# Patient Record
Sex: MALE | Race: WHITE | NOT HISPANIC OR LATINO | Employment: OTHER | URBAN - METROPOLITAN AREA
[De-identification: names, ages, dates, MRNs, and addresses within clinical notes are randomized per-mention and may not be internally consistent; named-entity substitution may affect disease eponyms.]

---

## 2024-03-22 ENCOUNTER — APPOINTMENT (OUTPATIENT)
Dept: OPHTHALMOLOGY | Facility: CLINIC | Age: 74
End: 2024-03-22
Payer: COMMERCIAL

## 2024-06-17 ENCOUNTER — APPOINTMENT (OUTPATIENT)
Dept: OPHTHALMOLOGY | Facility: CLINIC | Age: 74
End: 2024-06-17
Payer: COMMERCIAL

## 2024-06-17 DIAGNOSIS — H25.13 NUCLEAR SCLEROTIC CATARACT OF BOTH EYES: ICD-10-CM

## 2024-06-17 DIAGNOSIS — H35.373 EPIRETINAL MEMBRANE (ERM) OF BOTH EYES: ICD-10-CM

## 2024-06-17 DIAGNOSIS — H52.4 PRESBYOPIA: ICD-10-CM

## 2024-06-17 DIAGNOSIS — H52.03 HYPERMETROPIA OF BOTH EYES: ICD-10-CM

## 2024-06-17 DIAGNOSIS — H52.223 REGULAR ASTIGMATISM OF BOTH EYES: Primary | ICD-10-CM

## 2024-06-17 PROBLEM — F41.1 GAD (GENERALIZED ANXIETY DISORDER): Status: ACTIVE | Noted: 2017-12-08

## 2024-06-17 PROBLEM — J45.20 MILD INTERMITTENT ASTHMA, UNCOMPLICATED (HHS-HCC): Status: ACTIVE | Noted: 2017-12-08

## 2024-06-17 PROBLEM — E78.5 HYPERLIPIDEMIA, UNSPECIFIED: Status: ACTIVE | Noted: 2019-10-02

## 2024-06-17 PROBLEM — I10 HTN (HYPERTENSION): Status: ACTIVE | Noted: 2021-05-21

## 2024-06-17 PROCEDURE — 92134 CPTRZ OPH DX IMG PST SGM RTA: CPT | Performed by: OPTOMETRIST

## 2024-06-17 PROCEDURE — 92015 DETERMINE REFRACTIVE STATE: CPT | Performed by: OPTOMETRIST

## 2024-06-17 PROCEDURE — 92004 COMPRE OPH EXAM NEW PT 1/>: CPT | Performed by: OPTOMETRIST

## 2024-06-17 RX ORDER — ATORVASTATIN CALCIUM 20 MG/1
20 TABLET, FILM COATED ORAL DAILY
COMMUNITY

## 2024-06-17 RX ORDER — HYDROCHLOROTHIAZIDE 25 MG/1
25 TABLET ORAL DAILY
COMMUNITY

## 2024-06-17 RX ORDER — OMEPRAZOLE 40 MG/1
CAPSULE, DELAYED RELEASE ORAL
COMMUNITY

## 2024-06-17 RX ORDER — AMLODIPINE BESYLATE 10 MG/1
10 TABLET ORAL DAILY
COMMUNITY

## 2024-06-17 ASSESSMENT — CONF VISUAL FIELD
OS_INFERIOR_TEMPORAL_RESTRICTION: 0
OD_SUPERIOR_NASAL_RESTRICTION: 0
OD_SUPERIOR_TEMPORAL_RESTRICTION: 0
OS_NORMAL: 1
OS_SUPERIOR_TEMPORAL_RESTRICTION: 0
OD_INFERIOR_TEMPORAL_RESTRICTION: 0
OD_NORMAL: 1
OD_INFERIOR_NASAL_RESTRICTION: 0
OS_INFERIOR_NASAL_RESTRICTION: 0
OS_SUPERIOR_NASAL_RESTRICTION: 0

## 2024-06-17 ASSESSMENT — EXTERNAL EXAM - LEFT EYE: OS_EXAM: NORMAL

## 2024-06-17 ASSESSMENT — ENCOUNTER SYMPTOMS
RESPIRATORY NEGATIVE: 0
HEMATOLOGIC/LYMPHATIC NEGATIVE: 0
CARDIOVASCULAR NEGATIVE: 0
EYES NEGATIVE: 1
NEUROLOGICAL NEGATIVE: 0
ENDOCRINE NEGATIVE: 0
CONSTITUTIONAL NEGATIVE: 0
PSYCHIATRIC NEGATIVE: 0
GASTROINTESTINAL NEGATIVE: 0
MUSCULOSKELETAL NEGATIVE: 0
ALLERGIC/IMMUNOLOGIC NEGATIVE: 0

## 2024-06-17 ASSESSMENT — TONOMETRY
OS_IOP_MMHG: 14
OD_IOP_MMHG: 12
IOP_METHOD: GOLDMANN APPLANATION

## 2024-06-17 ASSESSMENT — REFRACTION_MANIFEST
OD_ADD: +2.50
OS_AXIS: 080
OD_AXIS: 070
OS_SPHERE: +0.50
OS_ADD: +2.50
OS_CYLINDER: -0.50
OD_CYLINDER: -0.50
OD_SPHERE: PLANO

## 2024-06-17 ASSESSMENT — SLIT LAMP EXAM - LIDS
COMMENTS: DERMATOCHALASIS W/ HOODING
COMMENTS: DERMATOCHALASIS W/ HOODING

## 2024-06-17 ASSESSMENT — REFRACTION
OD_SPHERE: +0.50
OS_SPHERE: +1.00
OD_CYLINDER: -0.75
OS_CYLINDER: -0.75
OS_AXIS: 080
OD_AXIS: 070
OS_ADD: +2.50
OD_ADD: +2.50

## 2024-06-17 ASSESSMENT — VISUAL ACUITY
OD_SC: 20/40
METHOD: SNELLEN - LINEAR
OS_SC: 20/20
OD_PH_SC: 20/30

## 2024-06-17 ASSESSMENT — CUP TO DISC RATIO
OD_RATIO: 0.15
OS_RATIO: 0.2

## 2024-06-17 ASSESSMENT — EXTERNAL EXAM - RIGHT EYE: OD_EXAM: NORMAL

## 2024-06-17 NOTE — PROGRESS NOTES
Assessment/Plan   Diagnoses and all orders for this visit:  Regular astigmatism of both eyes  Presbyopia  Hypermetropia of both eyes  Spec Rx released. Optional to fill as patient not currently having any difficulty. Ok to use OTC readers as desired. Ocular health WNL for age OU. Monitor 1 year. Refraction billed today. Pt wishes to get BF, discussed, ok to wear as needed.     Epiretinal membrane (ERM) of both eyes  -     OCT, Retina - OU - Both Eyes  Discussed findings and etiology. ERM not visually significant at this time. RTC 1 year for OCT Macula or sooner with changes in vision.    Nuclear sclerotic cataract of both eyes  Patient's cataracts are not visually significant. Will monitor for changes. No indication for surgery at this time.    Pt does ok w/o , but may need to use [facility] or [facility] language line in future.

## 2025-02-12 ENCOUNTER — HOSPITAL ENCOUNTER (EMERGENCY)
Facility: HOSPITAL | Age: 75
Discharge: HOME | End: 2025-02-12
Attending: EMERGENCY MEDICINE
Payer: COMMERCIAL

## 2025-02-12 ENCOUNTER — APPOINTMENT (OUTPATIENT)
Dept: RADIOLOGY | Facility: HOSPITAL | Age: 75
End: 2025-02-12
Payer: COMMERCIAL

## 2025-02-12 VITALS
OXYGEN SATURATION: 98 % | RESPIRATION RATE: 18 BRPM | SYSTOLIC BLOOD PRESSURE: 143 MMHG | HEART RATE: 70 BPM | BODY MASS INDEX: 31.39 KG/M2 | TEMPERATURE: 97.5 F | WEIGHT: 200 LBS | HEIGHT: 67 IN | DIASTOLIC BLOOD PRESSURE: 67 MMHG

## 2025-02-12 DIAGNOSIS — S92.011A CLOSED DISPLACED FRACTURE OF BODY OF RIGHT CALCANEUS, INITIAL ENCOUNTER: ICD-10-CM
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
## 2025-02-12 PROCEDURE — 29515 APPLICATION SHORT LEG SPLINT: CPT | Mod: RT

## 2025-02-12 PROCEDURE — 73610 X-RAY EXAM OF ANKLE: CPT | Mod: RIGHT SIDE | Performed by: RADIOLOGY

## 2025-02-12 PROCEDURE — 2500000001 HC RX 250 WO HCPCS SELF ADMINISTERED DRUGS (ALT 637 FOR MEDICARE OP): Performed by: EMERGENCY MEDICINE

## 2025-02-12 PROCEDURE — 73610 X-RAY EXAM OF ANKLE: CPT | Mod: RT

## 2025-02-12 PROCEDURE — 99283 EMERGENCY DEPT VISIT LOW MDM: CPT | Performed by: EMERGENCY MEDICINE

## 2025-02-12 RX ORDER — IBUPROFEN 600 MG/1
600 TABLET ORAL ONCE
Status: COMPLETED | OUTPATIENT
Start: 2025-02-12 | End: 2025-02-12

## 2025-02-12 RX ORDER — ACETAMINOPHEN 325 MG/1
650 TABLET ORAL ONCE
Status: COMPLETED | OUTPATIENT
Start: 2025-02-12 | End: 2025-02-12

## 2025-02-12 RX ADMIN — ACETAMINOPHEN 650 MG: 325 TABLET, FILM COATED ORAL at 16:14

## 2025-02-12 RX ADMIN — IBUPROFEN 600 MG: 600 TABLET, FILM COATED ORAL at 16:14

## 2025-02-12 ASSESSMENT — COLUMBIA-SUICIDE SEVERITY RATING SCALE - C-SSRS
6. HAVE YOU EVER DONE ANYTHING, STARTED TO DO ANYTHING, OR PREPARED TO DO ANYTHING TO END YOUR LIFE?: NO
1. IN THE PAST MONTH, HAVE YOU WISHED YOU WERE DEAD OR WISHED YOU COULD GO TO SLEEP AND NOT WAKE UP?: NO
2. HAVE YOU ACTUALLY HAD ANY THOUGHTS OF KILLING YOURSELF?: NO

## 2025-02-12 NOTE — ED PROVIDER NOTES
Emergency Department Provider Note        History of Present Illness     History provided by: Patient  Limitations to History: Language Barrier  External Records Reviewed with Brief Summary: None    HPI:  Edwin Mayorga is a 74 y.o. male [place] to the ED after mechanical fall down a ladder.  Complaining of right foot pain.  Denies head injury or other injuries.    Physical Exam   Triage vitals:  T 36.4 °C (97.5 °F)  HR 70  /67  RR 18  O2 98 %      General: Awake, alert, in no acute distress  Eyes: Gaze conjugate.  No scleral icterus or injection  HENT: Normo-cephalic, atraumatic. No stridor  CV: Regular rate, regular rhythm. Radial pulses 2+ bilaterally  Resp: Breathing non-labored, speaking in full sentences.  Clear to auscultation bilaterally  GI: Soft, non-distended, non-tender. No rebound or guarding.  : Deferred  MSK/Extremities: No gross bony deformities. Moving all extremities. Right ankle swelling  Skin: Warm. Appropriate color  Neuro: Alert. Oriented. Face symmetric. Speech is fluent.  Gross strength and sensation intact in b/l UE and LEs  Psych: Appropriate mood and affect    Medical Decision Making & ED Course   Medical Decision Makin y.o. male to the ED with right ankle and foot pain after mechanical fall.  No other injuries.  Upon arrival to the ED in no acute distress.  On examination he has diffuse swelling around the right ankle more T's.  Otherwise he has range of motion and is neurovascular intact.  X-ray imaging significant for a calcaneal fracture.  Podiatry (Dr. Sorensen) was consulted who recommends close follow-up.  Patient was placed in a splint.  Crutches were provided.  Was given follow-up information on discharge paperwork.  All questions were answered.  ----      Differential diagnoses considered include but are not limited to: N/A     Social Determinants of Health which Significantly Impact Care: None identified     EKG Independent Interpretation: EKG not  obtained    Independent Result Review and Interpretation: Relevant laboratory and radiographic results were reviewed and independently interpreted by myself.  As necessary, they are commented on in the ED Course.    Chronic conditions affecting the patient's care: As documented above in [facility]    The patient was discussed with the following consultants/services: None    Care Considerations: As documented above in [facility]    ED Course:  Diagnoses as of 02/15/25 1133   Fall, initial encounter   Closed displaced fracture of body of right calcaneus, initial encounter     Disposition   As a result of the work-up, the patient was discharged home.  he was informed of his diagnosis and instructed to come back with any concerns or worsening of condition.  he and was agreeable to the plan as discussed above.  he was given the opportunity to ask questions.  All of the patient's questions were answered.    Procedures   Procedures    Patient was seen independently    Avtar Berry MD  Emergency Medicine     Avtar Berry MD  02/15/25 113

## 2025-02-12 NOTE — ED TRIAGE NOTES
Patient arrives from home with complaints of right ankle pain and back pain after having fallen off a ladder onto his feet and possibly twisting his ankle, he then fell sideways onto the floor and injured his back

## 2025-06-20 ENCOUNTER — HOSPITAL ENCOUNTER (EMERGENCY)
Facility: HOSPITAL | Age: 75
Discharge: HOME | End: 2025-06-20
Attending: EMERGENCY MEDICINE
Payer: MEDICARE

## 2025-06-20 ENCOUNTER — APPOINTMENT (OUTPATIENT)
Dept: RADIOLOGY | Facility: HOSPITAL | Age: 75
End: 2025-06-20
Payer: MEDICARE

## 2025-06-20 ENCOUNTER — APPOINTMENT (OUTPATIENT)
Dept: CARDIOLOGY | Facility: HOSPITAL | Age: 75
End: 2025-06-20
Payer: MEDICARE

## 2025-06-20 ENCOUNTER — HOSPITAL ENCOUNTER (OUTPATIENT)
Dept: CARDIOLOGY | Facility: HOSPITAL | Age: 75
Discharge: HOME | End: 2025-06-20
Payer: MEDICARE

## 2025-06-20 VITALS
WEIGHT: 200 LBS | SYSTOLIC BLOOD PRESSURE: 124 MMHG | BODY MASS INDEX: 31.39 KG/M2 | DIASTOLIC BLOOD PRESSURE: 62 MMHG | TEMPERATURE: 96.6 F | RESPIRATION RATE: 16 BRPM | HEIGHT: 67 IN | HEART RATE: 67 BPM | OXYGEN SATURATION: 95 %

## 2025-06-20 DIAGNOSIS — K80.20 SYMPTOMATIC CHOLELITHIASIS: Primary | ICD-10-CM

## 2025-06-20 DIAGNOSIS — R10.9 ABDOMINAL PAIN: ICD-10-CM

## 2025-06-20 LAB
ALBUMIN SERPL BCP-MCNC: 4.3 G/DL (ref 3.4–5)
ALP SERPL-CCNC: 87 U/L (ref 33–136)
ALT SERPL W P-5'-P-CCNC: 10 U/L (ref 10–52)
ANION GAP SERPL CALC-SCNC: 14 MMOL/L (ref 10–20)
AST SERPL W P-5'-P-CCNC: 11 U/L (ref 9–39)
BASOPHILS # BLD AUTO: 0.03 X10*3/UL (ref 0–0.1)
BASOPHILS NFR BLD AUTO: 0.2 %
BILIRUB SERPL-MCNC: 1.2 MG/DL (ref 0–1.2)
BNP SERPL-MCNC: 29 PG/ML (ref 0–99)
BUN SERPL-MCNC: 20 MG/DL (ref 6–23)
CALCIUM SERPL-MCNC: 9.1 MG/DL (ref 8.6–10.3)
CARDIAC TROPONIN I PNL SERPL HS: 4 NG/L (ref 0–20)
CARDIAC TROPONIN I PNL SERPL HS: <3 NG/L (ref 0–20)
CHLORIDE SERPL-SCNC: 100 MMOL/L (ref 98–107)
CO2 SERPL-SCNC: 27 MMOL/L (ref 21–32)
CREAT SERPL-MCNC: 0.92 MG/DL (ref 0.5–1.3)
EGFRCR SERPLBLD CKD-EPI 2021: 87 ML/MIN/1.73M*2
EOSINOPHIL # BLD AUTO: 0 X10*3/UL (ref 0–0.4)
EOSINOPHIL NFR BLD AUTO: 0 %
ERYTHROCYTE [DISTWIDTH] IN BLOOD BY AUTOMATED COUNT: 12.9 % (ref 11.5–14.5)
GLUCOSE SERPL-MCNC: 175 MG/DL (ref 74–99)
HCT VFR BLD AUTO: 41.7 % (ref 41–52)
HGB BLD-MCNC: 15 G/DL (ref 13.5–17.5)
IMM GRANULOCYTES # BLD AUTO: 0.07 X10*3/UL (ref 0–0.5)
IMM GRANULOCYTES NFR BLD AUTO: 0.5 % (ref 0–0.9)
LIPASE SERPL-CCNC: 17 U/L (ref 9–82)
LYMPHOCYTES # BLD AUTO: 0.51 X10*3/UL (ref 0.8–3)
LYMPHOCYTES NFR BLD AUTO: 3.7 %
MCH RBC QN AUTO: 30.5 PG (ref 26–34)
MCHC RBC AUTO-ENTMCNC: 36 G/DL (ref 32–36)
MCV RBC AUTO: 85 FL (ref 80–100)
MONOCYTES # BLD AUTO: 0.64 X10*3/UL (ref 0.05–0.8)
MONOCYTES NFR BLD AUTO: 4.7 %
NEUTROPHILS # BLD AUTO: 12.39 X10*3/UL (ref 1.6–5.5)
NEUTROPHILS NFR BLD AUTO: 90.9 %
NRBC BLD-RTO: 0 /100 WBCS (ref 0–0)
PLATELET # BLD AUTO: 176 X10*3/UL (ref 150–450)
POTASSIUM SERPL-SCNC: 3.1 MMOL/L (ref 3.5–5.3)
PROT SERPL-MCNC: 6.8 G/DL (ref 6.4–8.2)
RBC # BLD AUTO: 4.92 X10*6/UL (ref 4.5–5.9)
SODIUM SERPL-SCNC: 138 MMOL/L (ref 136–145)
WBC # BLD AUTO: 13.6 X10*3/UL (ref 4.4–11.3)

## 2025-06-20 PROCEDURE — 84484 ASSAY OF TROPONIN QUANT: CPT | Performed by: EMERGENCY MEDICINE

## 2025-06-20 PROCEDURE — 2500000005 HC RX 250 GENERAL PHARMACY W/O HCPCS: Performed by: EMERGENCY MEDICINE

## 2025-06-20 PROCEDURE — 74177 CT ABD & PELVIS W/CONTRAST: CPT

## 2025-06-20 PROCEDURE — 96376 TX/PRO/DX INJ SAME DRUG ADON: CPT

## 2025-06-20 PROCEDURE — 96374 THER/PROPH/DIAG INJ IV PUSH: CPT | Mod: 59

## 2025-06-20 PROCEDURE — 36415 COLL VENOUS BLD VENIPUNCTURE: CPT | Performed by: EMERGENCY MEDICINE

## 2025-06-20 PROCEDURE — 83690 ASSAY OF LIPASE: CPT | Performed by: EMERGENCY MEDICINE

## 2025-06-20 PROCEDURE — 74177 CT ABD & PELVIS W/CONTRAST: CPT | Mod: FOREIGN READ | Performed by: RADIOLOGY

## 2025-06-20 PROCEDURE — 76705 ECHO EXAM OF ABDOMEN: CPT

## 2025-06-20 PROCEDURE — 2500000001 HC RX 250 WO HCPCS SELF ADMINISTERED DRUGS (ALT 637 FOR MEDICARE OP): Performed by: EMERGENCY MEDICINE

## 2025-06-20 PROCEDURE — 85025 COMPLETE CBC W/AUTO DIFF WBC: CPT | Performed by: EMERGENCY MEDICINE

## 2025-06-20 PROCEDURE — 2550000001 HC RX 255 CONTRASTS: Performed by: EMERGENCY MEDICINE

## 2025-06-20 PROCEDURE — 83880 ASSAY OF NATRIURETIC PEPTIDE: CPT | Performed by: EMERGENCY MEDICINE

## 2025-06-20 PROCEDURE — 2500000004 HC RX 250 GENERAL PHARMACY W/ HCPCS (ALT 636 FOR OP/ED): Performed by: EMERGENCY MEDICINE

## 2025-06-20 PROCEDURE — 76705 ECHO EXAM OF ABDOMEN: CPT | Performed by: RADIOLOGY

## 2025-06-20 PROCEDURE — 99285 EMERGENCY DEPT VISIT HI MDM: CPT | Mod: 25 | Performed by: EMERGENCY MEDICINE

## 2025-06-20 PROCEDURE — 71045 X-RAY EXAM CHEST 1 VIEW: CPT | Performed by: RADIOLOGY

## 2025-06-20 PROCEDURE — 80053 COMPREHEN METABOLIC PANEL: CPT | Performed by: EMERGENCY MEDICINE

## 2025-06-20 PROCEDURE — 96375 TX/PRO/DX INJ NEW DRUG ADDON: CPT

## 2025-06-20 PROCEDURE — 71045 X-RAY EXAM CHEST 1 VIEW: CPT

## 2025-06-20 PROCEDURE — 93005 ELECTROCARDIOGRAM TRACING: CPT

## 2025-06-20 RX ORDER — ONDANSETRON HYDROCHLORIDE 2 MG/ML
4 INJECTION, SOLUTION INTRAVENOUS ONCE
Status: COMPLETED | OUTPATIENT
Start: 2025-06-20 | End: 2025-06-20

## 2025-06-20 RX ORDER — ALUMINUM HYDROXIDE, MAGNESIUM HYDROXIDE, AND SIMETHICONE 1200; 120; 1200 MG/30ML; MG/30ML; MG/30ML
30 SUSPENSION ORAL ONCE
Status: COMPLETED | OUTPATIENT
Start: 2025-06-20 | End: 2025-06-20

## 2025-06-20 RX ORDER — ONDANSETRON 4 MG/1
4 TABLET, ORALLY DISINTEGRATING ORAL EVERY 8 HOURS PRN
Qty: 10 TABLET | Refills: 0 | Status: ON HOLD | OUTPATIENT
Start: 2025-06-20 | End: 2025-07-01 | Stop reason: ALTCHOICE

## 2025-06-20 RX ORDER — FAMOTIDINE 10 MG/ML
20 INJECTION, SOLUTION INTRAVENOUS ONCE
Status: COMPLETED | OUTPATIENT
Start: 2025-06-20 | End: 2025-06-20

## 2025-06-20 RX ORDER — MORPHINE SULFATE 4 MG/ML
4 INJECTION, SOLUTION INTRAMUSCULAR; INTRAVENOUS ONCE
Status: COMPLETED | OUTPATIENT
Start: 2025-06-20 | End: 2025-06-20

## 2025-06-20 RX ORDER — LIDOCAINE HYDROCHLORIDE 20 MG/ML
15 SOLUTION OROPHARYNGEAL ONCE
Status: COMPLETED | OUTPATIENT
Start: 2025-06-20 | End: 2025-06-20

## 2025-06-20 RX ADMIN — MORPHINE SULFATE 4 MG: 4 INJECTION, SOLUTION INTRAMUSCULAR; INTRAVENOUS at 07:26

## 2025-06-20 RX ADMIN — ONDANSETRON 4 MG: 2 INJECTION INTRAMUSCULAR; INTRAVENOUS at 05:47

## 2025-06-20 RX ADMIN — LIDOCAINE HYDROCHLORIDE 15 ML: 20 SOLUTION ORAL at 03:00

## 2025-06-20 RX ADMIN — ONDANSETRON 4 MG: 2 INJECTION INTRAMUSCULAR; INTRAVENOUS at 03:00

## 2025-06-20 RX ADMIN — MORPHINE SULFATE 4 MG: 4 INJECTION, SOLUTION INTRAMUSCULAR; INTRAVENOUS at 05:47

## 2025-06-20 RX ADMIN — IOHEXOL 79 ML: 350 INJECTION, SOLUTION INTRAVENOUS at 05:35

## 2025-06-20 RX ADMIN — FAMOTIDINE 20 MG: 10 INJECTION, SOLUTION INTRAVENOUS at 03:00

## 2025-06-20 RX ADMIN — ONDANSETRON 4 MG: 2 INJECTION INTRAMUSCULAR; INTRAVENOUS at 07:25

## 2025-06-20 RX ADMIN — ALUMINUM HYDROXIDE, MAGNESIUM HYDROXIDE, AND DIMETHICONE 30 ML: 200; 20; 200 SUSPENSION ORAL at 03:00

## 2025-06-20 ASSESSMENT — PAIN - FUNCTIONAL ASSESSMENT
PAIN_FUNCTIONAL_ASSESSMENT: 0-10

## 2025-06-20 ASSESSMENT — PAIN DESCRIPTION - LOCATION
LOCATION: ABDOMEN

## 2025-06-20 ASSESSMENT — PAIN SCALES - GENERAL
PAINLEVEL_OUTOF10: 7
PAINLEVEL_OUTOF10: 8
PAINLEVEL_OUTOF10: 8
PAINLEVEL_OUTOF10: 6

## 2025-06-20 ASSESSMENT — PAIN DESCRIPTION - ORIENTATION
ORIENTATION: MID;UPPER
ORIENTATION: MID

## 2025-06-20 ASSESSMENT — PAIN DESCRIPTION - PAIN TYPE
TYPE: ACUTE PAIN
TYPE: ACUTE PAIN

## 2025-06-20 ASSESSMENT — PAIN DESCRIPTION - DESCRIPTORS: DESCRIPTORS: SHARP

## 2025-06-20 NOTE — ED PROVIDER NOTES
HPI   Chief Complaint   Patient presents with    Abdominal Pain       74-year-old male presenting with epigastric pain which started about 2200 last evening.  Patient states that he has a history of GERD and he takes omeprazole for this.  He states that yesterday he ate tomato based Borsht which was spicy and then for dinner at 6:00 he had spaghetti.   He did have an episode of nausea and vomiting.  Denies fevers or chills.  Denies shortness of breath, fever, chills.              Patient History   Medical History[1]  Surgical History[2]  Family History[3]  Social History[4]    Physical Exam   ED Triage Vitals [06/20/25 0201]   Temperature Heart Rate Respirations BP   35.9 °C (96.6 °F) 65 16 143/67      Pulse Ox Temp Source Heart Rate Source Patient Position   100 % Temporal Monitor Sitting      BP Location FiO2 (%)     Right arm --       Physical Exam  Constitutional:       General: He is not in acute distress.  HENT:      Head: Normocephalic and atraumatic.   Eyes:      Extraocular Movements: Extraocular movements intact.   Pulmonary:      Effort: Pulmonary effort is normal.      Breath sounds: Normal breath sounds.   Abdominal:      Tenderness: There is abdominal tenderness in the epigastric area. Negative signs include Rees's sign.   Skin:     General: Skin is warm.   Neurological:      General: No focal deficit present.      Mental Status: He is alert.   Psychiatric:         Mood and Affect: Mood normal.           ED Course & MDM                  No data recorded     Starks Coma Scale Score: 15 (06/20/25 0203 : Leola Ireland RN)                           Medical Decision Making  Patient presented with epigastric pain, nausea, vomiting.  Differential includes but is not limited to ACS, GERD, gastritis, peptic ulcer disease, acute cholelithiasis, pancreatitis, anemia, electrolyte abnormalities.  IV line was established.  Patient was medicated with IV Pepcid and a GI cocktail.  CBC will be obtained to assess  white blood cell count, hemoglobin and platelet.  CMP to assess liver function, renal function, electrolytes, glucose.  High-sensitivity troponin and EKG to assess for ischemia/dysrhythmia.  Chest x-ray to rule out pneumonia.  Patient states minimal improvement with GI cocktail.Neurosurgery CBC shows a slight leukocytosis of 13.6.  Hemoglobin hematocrit are stable.  Platelets are normal.  Renal function and electrolytes unremarkable with exception of a potassium of 3.1 lipase within normal limits at 17 BNP 29 troponin is 4.  Will order morphine and Zofran for the patient's pain.  Will obtain CT of the abdomen pelvis with IV contrast.  CT of the abdomen pelvis with IV contrast shows the gallbladder is mildly distended.  There are punctate foci of air within the gallbladder.  No common bile duct distention recommendation is for further evaluation with gallbladder ultrasound.  Patient will be signed out to incoming ED physician for follow-up of ultrasound and disposition.        Procedure  Procedures         [1] No past medical history on file.  [2] No past surgical history on file.  [3] No family history on file.  [4]   Social History  Tobacco Use    Smoking status: Former     Types: Cigarettes    Smokeless tobacco: Not on file   Vaping Use    Vaping status: Never Used   Substance Use Topics    Alcohol use: Not on file    Drug use: Never        Joe Bailey,   06/20/25 0716

## 2025-06-20 NOTE — PROGRESS NOTES
This patient was seen by the offgoing provider.  Please see their note for full history and physical exam.    Briefly, this is a 74 y.o. male presenting to the ED with ***        Jose Bermudez MD  Emergency Medicine Attending

## 2025-06-20 NOTE — ED TRIAGE NOTES
Patient presents to ED with complaints of epigastric  pain since 2200 yesterday evening. Patient state sharp epigastric pain with associated nausea and vomiting.

## 2025-06-20 NOTE — DISCHARGE INSTRUCTIONS
You were seen in the ED for abdominal pain.  This is likely related to gallstones and sludge in your gallbladder.  Use ibuprofen and Tylenol for pain.  Use Zofran as prescribed for nausea and vomiting.  Follow up with the general surgery doctor and your primary doctor.  Return to the ED for any worsening symptoms.    --------------    ??? ?????????? ?? ?????????? ???????????? ???????? ?? ???????? ?? ???? ? ??????. ????????, ?? ???'????? ? ???????? ???????? ?? ?????? ? ?????? ???????? ??????. ?????????????? ????????? ?? ???????? ??? ????. ?????????????? ?????? ?? ???????????? ?????? ??? ?????? ?? ???????. ?????????? ?? ?????? ????????? ???????? ?? ?????? ????????? ??????. ??????????? ?? ?????????? ???????????? ???????? ??? ????-????? ?????????? ?????????.

## 2025-06-24 NOTE — PROGRESS NOTES
History Of Present Illness  HPI   The patient is a 74-year-old male who was seen in the emergency room on June 20, 2025 with epigastric abdominal pain.  I reviewed the emergency room notes.  The patient had upper abdominal sharp pain associated with nausea and vomiting which lasted 2 days.  His symptoms have now improved, but he still has mild right upper quadrant discomfort with inspiration and the area remains a bit tender.  He has a poor appetite but has been tolerating liquids without vomiting.  No prior similar symptoms.  No history of jaundice.    Past medical history:  GERD  Prostate cancer treated with radiation therapy.  Follows at [facility].  Hypertension  Hyperlipidemia  Fell off a ladder 5 months ago and fractured his right heel.  He is wearing a right boot.    Past Medical History  He has no past medical history on file.    Surgical History  He has no past surgical history on file.     Allergies  Patient has no known allergies.    Social History  He reports that he has quit smoking. His smoking use included cigarettes. He has never used smokeless tobacco. He reports that he does not currently use alcohol. He reports that he does not use drugs.    Family History  Family History[1]    Review of Systems  Review of Systems:  Constitutional:  no fever, no chills, no significant weight change  Neurological: No history of CVA or seizure disorder  Eyes: No pain, no recent visual change  ENT:  No recent hearing loss  Neck: No pain  Cardiovascular: No chest pain, no history of cardiac disease such as myocardial infarction or arrhythmia or congestive heart failure  Pulmonary: Bronchitis with shortness of breath.  No other history of pulmonary disease such as pneumonia or COPD  Breast: No history of breast disease or breast mass  Gastrointestinal: As above.  No constipation or diarrhea or blood in the stool.  No history of ulcers.  No liver or pancreas disease.  No intestinal disorder.  Genitourinary: No hematuria or  "dysuria, no kidney disease  Musculoskeletal:  no arthralgia, no muscle or bone pain  Integumentary:  no rash  Psychiatric: Anxiety  Endocrine:  no history of diabetes  Hematologic/Lymphatic: No easy bruising or bleeding    Last Recorded Vitals  Blood pressure 148/74, pulse 79, temperature 36.2 °C (97.1 °F), temperature source Temporal, height 1.702 m (5' 7\"), weight 84.8 kg (187 lb), SpO2 95%.    Physical Exam  Constitutional: Well-developed, well-nourished, alert and oriented, no acute distress  Skin: Warm and dry, no lesions, no rashes, no jaundice  HEENT: Normocephalic, atraumatic, EOMI, no scleral icterus, eyes have no redness or swelling or discharge, external inspection of ears and nose is normal, mucous membranes moist  Neck: Soft, nontender, no mass or adenopathy  Cardiac: Regular rate and rhythm, no murmur  Chest: Patent airway, clear to auscultation, normal breath sounds with good chest expansion, no wheezes or rales or rhonchi noted, thorax symmetric  Abdomen: Nondistended, positive bowel sounds, soft, minimal right upper quadrant tenderness, the rest of the abdomen is nontender, no mass  Rectal: Not performed  Extremities: Right boot in place.  No lower extremity edema or calf tenderness  Lymphatic: No cervical adenopathy  Musculoskeletal: Range of motion intact, no joint swelling, normal strength  Neurological: Alert and oriented x3, intact sensory and motor function, no obvious focal neurologic abnormalities, normal gait  Psychological: Appropriate mood and behavior    Relevant Results  Labs from June 20, 2025: WBC 13.6, hemoglobin 15.0, platelet 176  Potassium 3.1, glucose 175, CMP otherwise normal.  Lipase normal.    I reviewed the CT abdomen/pelvis report and images from June 20, 2025:  IMPRESSION:  1.The gallbladder is mildly distended. There are punctate foci of  air within the gallbladder. No calcified gallstones. No common bile  duct distention. Recommend further evaluation with " gallbladder  ultrasound.  2.There are 2 relatively hyperdense lesions within the left  kidney measuring up to 4 cm. These may represent hemorrhagic or  proteinaceous cysts. Recommend further evaluation with nonemergent  renal MRI.    Reviewed the right upper quadrant ultrasound report and images from June 20, 2025:  IMPRESSION:  1. Echogenic sludge and debris within the gallbladder. No  cholelithiasis or acute cholecystitis. Punctate gas collection seen  on prior CT imaging not definitely identified on the current study.    2. Right renal cysts.    I reviewed the CT and ultrasound with a radiologist who feels the patient likely has a gallbladder full of tiny stones.    Assessment/Plan   Diagnoses and all orders for this visit:  Calculus of gallbladder with chronic cholecystitis without obstruction  -     Referral to General Surgery    74-year-old male with cholelithiasis and symptoms consistent with chronic cholecystitis.  Recommend laparoscopic cholecystectomy with cholangiogram with possible open operation.  I discussed the procedure and risks with the patient and his son. The risks include bleeding, infection, bile leak, injury to surrounding structures such as the common bile duct/duodenum, cardiac/pulmonary/renal complications, post op diarrhea.  The patient does not wish to schedule cholecystectomy at this time.  I discussed the risks of not operating which include acute cholecystitis, cholangitis, pancreatitis.  Recommend low-fat diet.  Follow-up as needed if the patient's symptoms worsen or if he decides to schedule cholecystectomy.  I also told the patient to follow-up with PCP regarding left kidney lesion.      Brent Johnson MD         [1] No family history on file.

## 2025-06-25 ENCOUNTER — APPOINTMENT (OUTPATIENT)
Dept: SURGERY | Facility: CLINIC | Age: 75
End: 2025-06-25
Payer: MEDICARE

## 2025-06-25 VITALS
BODY MASS INDEX: 29.35 KG/M2 | OXYGEN SATURATION: 95 % | SYSTOLIC BLOOD PRESSURE: 148 MMHG | DIASTOLIC BLOOD PRESSURE: 74 MMHG | HEART RATE: 79 BPM | TEMPERATURE: 97.1 F | HEIGHT: 67 IN | WEIGHT: 187 LBS

## 2025-06-25 DIAGNOSIS — K80.10 CALCULUS OF GALLBLADDER WITH CHRONIC CHOLECYSTITIS WITHOUT OBSTRUCTION: ICD-10-CM

## 2025-06-25 PROCEDURE — 1159F MED LIST DOCD IN RCRD: CPT | Performed by: SURGERY

## 2025-06-25 PROCEDURE — 99204 OFFICE O/P NEW MOD 45 MIN: CPT | Performed by: SURGERY

## 2025-06-25 PROCEDURE — 3078F DIAST BP <80 MM HG: CPT | Performed by: SURGERY

## 2025-06-25 PROCEDURE — 3008F BODY MASS INDEX DOCD: CPT | Performed by: SURGERY

## 2025-06-25 PROCEDURE — 3077F SYST BP >= 140 MM HG: CPT | Performed by: SURGERY

## 2025-06-25 PROCEDURE — 1036F TOBACCO NON-USER: CPT | Performed by: SURGERY

## 2025-06-25 NOTE — LETTER
June 25, 2025     Raúl Weston DO  [address] [facility] [address]    Patient: Edwin Mayorga   YOB: 1950   Date of Visit: 6/25/2025       Dear Dr. Raúl Weston DO:    Thank you for referring Edwin Mayorga to me for evaluation. Below are my notes for this consultation.  If you have questions, please do not hesitate to call me. I look forward to following your patient along with you.       Sincerely,     Brent Johnson MD      CC: No Recipients  ______________________________________________________________________________________    History Of Present Illness  HPI   The patient is a 74-year-old male who was seen in the emergency room on June 20, 2025 with epigastric abdominal pain.  I reviewed the emergency room notes.  The patient had upper abdominal sharp pain associated with nausea and vomiting which lasted 2 days.  His symptoms have now improved, but he still has mild right upper quadrant discomfort with inspiration and the area remains a bit tender.  He has a poor appetite but has been tolerating liquids without vomiting.  No prior similar symptoms.  No history of jaundice.    Past medical history:  GERD  Prostate cancer treated with radiation therapy.  Follows at [facility].  Hypertension  Hyperlipidemia  Fell off a ladder 5 months ago and fractured his right heel.  He is wearing a right boot.    Past Medical History  He has no past medical history on file.    Surgical History  He has no past surgical history on file.     Allergies  Patient has no known allergies.    Social History  He reports that he has quit smoking. His smoking use included cigarettes. He has never used smokeless tobacco. He reports that he does not currently use alcohol. He reports that he does not use drugs.    Family History  Family History[1]    Review of Systems  Review of Systems:  Constitutional:  no fever, no chills, no significant weight change  Neurological: No history of CVA or seizure  "disorder  Eyes: No pain, no recent visual change  ENT:  No recent hearing loss  Neck: No pain  Cardiovascular: No chest pain, no history of cardiac disease such as myocardial infarction or arrhythmia or congestive heart failure  Pulmonary: Bronchitis with shortness of breath.  No other history of pulmonary disease such as pneumonia or COPD  Breast: No history of breast disease or breast mass  Gastrointestinal: As above.  No constipation or diarrhea or blood in the stool.  No history of ulcers.  No liver or pancreas disease.  No intestinal disorder.  Genitourinary: No hematuria or dysuria, no kidney disease  Musculoskeletal:  no arthralgia, no muscle or bone pain  Integumentary:  no rash  Psychiatric: Anxiety  Endocrine:  no history of diabetes  Hematologic/Lymphatic: No easy bruising or bleeding    Last Recorded Vitals  Blood pressure 148/74, pulse 79, temperature 36.2 °C (97.1 °F), temperature source Temporal, height 1.702 m (5' 7\"), weight 84.8 kg (187 lb), SpO2 95%.    Physical Exam  Constitutional: Well-developed, well-nourished, alert and oriented, no acute distress  Skin: Warm and dry, no lesions, no rashes, no jaundice  HEENT: Normocephalic, atraumatic, EOMI, no scleral icterus, eyes have no redness or swelling or discharge, external inspection of ears and nose is normal, mucous membranes moist  Neck: Soft, nontender, no mass or adenopathy  Cardiac: Regular rate and rhythm, no murmur  Chest: Patent airway, clear to auscultation, normal breath sounds with good chest expansion, no wheezes or rales or rhonchi noted, thorax symmetric  Abdomen: Nondistended, positive bowel sounds, soft, minimal right upper quadrant tenderness, the rest of the abdomen is nontender, no mass  Rectal: Not performed  Extremities: Right boot in place.  No lower extremity edema or calf tenderness  Lymphatic: No cervical adenopathy  Musculoskeletal: Range of motion intact, no joint swelling, normal strength  Neurological: Alert and " oriented x3, intact sensory and motor function, no obvious focal neurologic abnormalities, normal gait  Psychological: Appropriate mood and behavior    Relevant Results  Labs from June 20, 2025: WBC 13.6, hemoglobin 15.0, platelet 176  Potassium 3.1, glucose 175, CMP otherwise normal.  Lipase normal.    I reviewed the CT abdomen/pelvis report and images from June 20, 2025:  IMPRESSION:  1.The gallbladder is mildly distended. There are punctate foci of  air within the gallbladder. No calcified gallstones. No common bile  duct distention. Recommend further evaluation with gallbladder  ultrasound.  2.There are 2 relatively hyperdense lesions within the left  kidney measuring up to 4 cm. These may represent hemorrhagic or  proteinaceous cysts. Recommend further evaluation with nonemergent  renal MRI.    Reviewed the right upper quadrant ultrasound report and images from June 20, 2025:  IMPRESSION:  1. Echogenic sludge and debris within the gallbladder. No  cholelithiasis or acute cholecystitis. Punctate gas collection seen  on prior CT imaging not definitely identified on the current study.    2. Right renal cysts.    I reviewed the CT and ultrasound with a radiologist who feels the patient likely has a gallbladder full of tiny stones.    Assessment/Plan   Diagnoses and all orders for this visit:  Calculus of gallbladder with chronic cholecystitis without obstruction  -     Referral to General Surgery    74-year-old male with cholelithiasis and symptoms consistent with chronic cholecystitis.  Recommend laparoscopic cholecystectomy with cholangiogram with possible open operation.  I discussed the procedure and risks with the patient and his son. The risks include bleeding, infection, bile leak, injury to surrounding structures such as the common bile duct/duodenum, cardiac/pulmonary/renal complications, post op diarrhea.  The patient does not wish to schedule cholecystectomy at this time.  I discussed the risks of not  operating which include acute cholecystitis, cholangitis, pancreatitis.  Recommend low-fat diet.  Follow-up as needed if the patient's symptoms worsen or if he decides to schedule cholecystectomy.  I also told the patient to follow-up with PCP regarding left kidney lesion.      Brent Johnson MD         [1]  No family history on file.       [1]  No family history on file.

## 2025-06-27 LAB
ATRIAL RATE: 65 BPM
P OFFSET: 205 MS
P ONSET: 143 MS
PR INTERVAL: 154 MS
Q ONSET: 220 MS
QRS COUNT: 11 BEATS
QRS DURATION: 86 MS
QT INTERVAL: 414 MS
QTC CALCULATION(BAZETT): 430 MS
QTC FREDERICIA: 425 MS
R AXIS: 67 DEGREES
T AXIS: 65 DEGREES
T OFFSET: 427 MS
VENTRICULAR RATE: 65 BPM

## 2025-06-27 PROCEDURE — 93005 ELECTROCARDIOGRAM TRACING: CPT

## 2025-06-30 ENCOUNTER — PREP FOR PROCEDURE (OUTPATIENT)
Dept: SURGERY | Facility: CLINIC | Age: 75
End: 2025-06-30

## 2025-06-30 ENCOUNTER — APPOINTMENT (OUTPATIENT)
Dept: SURGERY | Facility: CLINIC | Age: 75
End: 2025-06-30
Payer: MEDICARE

## 2025-06-30 ENCOUNTER — HOSPITAL ENCOUNTER (OUTPATIENT)
Facility: HOSPITAL | Age: 75
Setting detail: OUTPATIENT SURGERY
End: 2025-06-30
Attending: SURGERY | Admitting: SURGERY
Payer: MEDICARE

## 2025-06-30 VITALS
BODY MASS INDEX: 29.35 KG/M2 | TEMPERATURE: 97.6 F | SYSTOLIC BLOOD PRESSURE: 141 MMHG | HEIGHT: 67 IN | DIASTOLIC BLOOD PRESSURE: 70 MMHG | OXYGEN SATURATION: 95 % | RESPIRATION RATE: 17 BRPM | WEIGHT: 187 LBS | HEART RATE: 69 BPM

## 2025-06-30 DIAGNOSIS — K80.10 CALCULUS OF GALLBLADDER WITH CHRONIC CHOLECYSTITIS WITHOUT OBSTRUCTION: Primary | ICD-10-CM

## 2025-06-30 PROCEDURE — 1159F MED LIST DOCD IN RCRD: CPT | Performed by: SURGERY

## 2025-06-30 PROCEDURE — 3008F BODY MASS INDEX DOCD: CPT | Performed by: SURGERY

## 2025-06-30 PROCEDURE — 99213 OFFICE O/P EST LOW 20 MIN: CPT | Performed by: SURGERY

## 2025-06-30 PROCEDURE — 1036F TOBACCO NON-USER: CPT | Performed by: SURGERY

## 2025-06-30 PROCEDURE — 3078F DIAST BP <80 MM HG: CPT | Performed by: SURGERY

## 2025-06-30 PROCEDURE — 1125F AMNT PAIN NOTED PAIN PRSNT: CPT | Performed by: SURGERY

## 2025-06-30 PROCEDURE — 3077F SYST BP >= 140 MM HG: CPT | Performed by: SURGERY

## 2025-06-30 RX ORDER — CEFAZOLIN SODIUM 2 G/100ML
2 INJECTION, SOLUTION INTRAVENOUS ONCE
Status: CANCELLED | OUTPATIENT
Start: 2025-06-30 | End: 2025-06-30

## 2025-06-30 RX ORDER — ALBUTEROL SULFATE 90 UG/1
1-2 INHALANT RESPIRATORY (INHALATION) EVERY 6 HOURS PRN
COMMUNITY
Start: 2025-01-13

## 2025-06-30 ASSESSMENT — PAIN SCALES - GENERAL: PAINLEVEL_OUTOF10: 5

## 2025-06-30 NOTE — H&P (VIEW-ONLY)
"History Of Present Illness  HPI   June 25, 2025  The patient is a 74-year-old male who was seen in the emergency room on June 20, 2025 with epigastric abdominal pain.  I reviewed the emergency room notes.  The patient had upper abdominal sharp pain associated with nausea and vomiting which lasted 2 days.  His symptoms have now improved, but he still has mild right upper quadrant discomfort with inspiration and the area remains a bit tender.  He has a poor appetite but has been tolerating liquids without vomiting.  No prior similar symptoms.  No history of jaundice.    June 30, 2025  The patient's symptoms have improved since yesterday, but he still has mild abdominal pain and nausea.  He now wishes to have his gallbladder removed.    Past medical history:  GERD  Prostate cancer treated with radiation therapy.  Follows at [facility].  Hypertension  Hyperlipidemia  Fell off a ladder 5 months ago and fractured his right heel.  He is wearing a right boot.      Past Medical History  He has no past medical history on file.    Surgical History  He has no past surgical history on file.     Allergies  Patient has no known allergies.    Social History  He reports that he quit smoking about 30 years ago. His smoking use included cigarettes. He has never used smokeless tobacco. He reports that he does not currently use alcohol. He reports that he does not use drugs.    Family History  Family History[1]    Last Recorded Vitals  Blood pressure 141/70, pulse 69, temperature 36.4 °C (97.6 °F), temperature source Temporal, resp. rate 17, height 1.702 m (5' 7\"), weight 84.8 kg (187 lb), SpO2 95%.    Physical Exam  Constitutional: Well-developed, well-nourished, alert and oriented, no acute distress  Skin: Warm and dry, no lesions, no rashes, no jaundice  HEENT: Normocephalic, atraumatic, EOMI, no scleral icterus, eyes have no redness or swelling or discharge, external inspection of ears and nose is normal, mucous membranes moist  Neck: " Soft, nontender, no mass or adenopathy  Cardiac: Regular rate and rhythm, no murmur  Chest: Patent airway, clear to auscultation, normal breath sounds with good chest expansion, no wheezes or rales or rhonchi noted, thorax symmetric  Abdomen: Nondistended, positive bowel sounds, soft, mild right upper quadrant tenderness, the rest of the abdomen is nontender, no mass  Rectal: Not performed  Extremities: No injury, no left lower extremity edema or calf tenderness.  Right boot in place.  Lymphatic: No cervical adenopathy  Musculoskeletal: Range of motion intact, no joint swelling, normal strength  Neurological: Alert and oriented x3, intact sensory and motor function, no obvious focal neurologic abnormalities, normal gait  Psychological: Appropriate mood and behavior    Relevant Results  Labs from June 20, 2025: WBC 13.6, hemoglobin 15.0, platelet 176  Potassium 3.1, glucose 175, CMP otherwise normal.  Lipase normal.     I have reviewed the CT abdomen/pelvis report and images from June 20, 2025:  IMPRESSION:  1.The gallbladder is mildly distended. There are punctate foci of  air within the gallbladder. No calcified gallstones. No common bile  duct distention. Recommend further evaluation with gallbladder  ultrasound.  2.There are 2 relatively hyperdense lesions within the left  kidney measuring up to 4 cm. These may represent hemorrhagic or  proteinaceous cysts. Recommend further evaluation with nonemergent  renal MRI.     I have reviewed the right upper quadrant ultrasound report and images from June 20, 2025:  IMPRESSION:  1. Echogenic sludge and debris within the gallbladder. No  cholelithiasis or acute cholecystitis. Punctate gas collection seen  on prior CT imaging not definitely identified on the current study.    2. Right renal cysts.     I have reviewed the CT and ultrasound with a radiologist who feels the patient likely has a gallbladder full of tiny stones.     Assessment/Plan   Diagnoses and all orders for  this visit:  Calculus of gallbladder with chronic cholecystitis without obstruction  -     Case Request Operating Room: Laparoscopic cholecystectomy with cholangiogram.  Possible open operation.; Standing  Other orders  -     Place in outpatient/hospital ambulatory surgery; Standing  -     NPO Diet Except: Sips with meds; Effective now; Standing  -     Height and weight; Standing  -     Insert and maintain peripheral IV; Standing  -     Saline lock IV; Standing  -     Vital Signs; Standing  -     Pulse oximetry, spot; Standing  -     Apply sequential compression device; Standing  -     Full code; Standing  -     ceFAZolin (Ancef) 2 g in dextrose (iso)  mL    74-year-old male with cholelithiasis and symptoms consistent with chronic cholecystitis.  Symptoms have improved, but are not completely gone.  The patient is now agreeable to laparoscopic cholecystectomy with cholangiogram with possible open operation.  I again discussed the procedure and risks with the patient and his son. The risks include bleeding, infection, bile leak, injury to surrounding structures such as the common bile duct/duodenum, cardiac/pulmonary/renal complications, post op diarrhea.  Electronic consent obtained.  I have told the patient to follow-up with PCP regarding left kidney lesion.         Brent Johnson MD         [1] No family history on file.

## 2025-06-30 NOTE — PROGRESS NOTES
"History Of Present Illness  HPI   June 25, 2025  The patient is a 74-year-old male who was seen in the emergency room on June 20, 2025 with epigastric abdominal pain.  I reviewed the emergency room notes.  The patient had upper abdominal sharp pain associated with nausea and vomiting which lasted 2 days.  His symptoms have now improved, but he still has mild right upper quadrant discomfort with inspiration and the area remains a bit tender.  He has a poor appetite but has been tolerating liquids without vomiting.  No prior similar symptoms.  No history of jaundice.    June 30, 2025  The patient's symptoms have improved since yesterday, but he still has mild abdominal pain and nausea.  He now wishes to have his gallbladder removed.    Past medical history:  GERD  Prostate cancer treated with radiation therapy.  Follows at [facility].  Hypertension  Hyperlipidemia  Fell off a ladder 5 months ago and fractured his right heel.  He is wearing a right boot.      Past Medical History  He has no past medical history on file.    Surgical History  He has no past surgical history on file.     Allergies  Patient has no known allergies.    Social History  He reports that he quit smoking about 30 years ago. His smoking use included cigarettes. He has never used smokeless tobacco. He reports that he does not currently use alcohol. He reports that he does not use drugs.    Family History  Family History[1]    Last Recorded Vitals  Blood pressure 141/70, pulse 69, temperature 36.4 °C (97.6 °F), temperature source Temporal, resp. rate 17, height 1.702 m (5' 7\"), weight 84.8 kg (187 lb), SpO2 95%.    Physical Exam  Constitutional: Well-developed, well-nourished, alert and oriented, no acute distress  Skin: Warm and dry, no lesions, no rashes, no jaundice  HEENT: Normocephalic, atraumatic, EOMI, no scleral icterus, eyes have no redness or swelling or discharge, external inspection of ears and nose is normal, mucous membranes moist  Neck: " Soft, nontender, no mass or adenopathy  Cardiac: Regular rate and rhythm, no murmur  Chest: Patent airway, clear to auscultation, normal breath sounds with good chest expansion, no wheezes or rales or rhonchi noted, thorax symmetric  Abdomen: Nondistended, positive bowel sounds, soft, mild right upper quadrant tenderness, the rest of the abdomen is nontender, no mass  Rectal: Not performed  Extremities: No injury, no left lower extremity edema or calf tenderness.  Right boot in place.  Lymphatic: No cervical adenopathy  Musculoskeletal: Range of motion intact, no joint swelling, normal strength  Neurological: Alert and oriented x3, intact sensory and motor function, no obvious focal neurologic abnormalities, normal gait  Psychological: Appropriate mood and behavior    Relevant Results  Labs from June 20, 2025: WBC 13.6, hemoglobin 15.0, platelet 176  Potassium 3.1, glucose 175, CMP otherwise normal.  Lipase normal.     I have reviewed the CT abdomen/pelvis report and images from June 20, 2025:  IMPRESSION:  1.The gallbladder is mildly distended. There are punctate foci of  air within the gallbladder. No calcified gallstones. No common bile  duct distention. Recommend further evaluation with gallbladder  ultrasound.  2.There are 2 relatively hyperdense lesions within the left  kidney measuring up to 4 cm. These may represent hemorrhagic or  proteinaceous cysts. Recommend further evaluation with nonemergent  renal MRI.     I have reviewed the right upper quadrant ultrasound report and images from June 20, 2025:  IMPRESSION:  1. Echogenic sludge and debris within the gallbladder. No  cholelithiasis or acute cholecystitis. Punctate gas collection seen  on prior CT imaging not definitely identified on the current study.    2. Right renal cysts.     I have reviewed the CT and ultrasound with a radiologist who feels the patient likely has a gallbladder full of tiny stones.     Assessment/Plan   Diagnoses and all orders for  this visit:  Calculus of gallbladder with chronic cholecystitis without obstruction  -     Case Request Operating Room: Laparoscopic cholecystectomy with cholangiogram.  Possible open operation.; Standing  Other orders  -     Place in outpatient/hospital ambulatory surgery; Standing  -     NPO Diet Except: Sips with meds; Effective now; Standing  -     Height and weight; Standing  -     Insert and maintain peripheral IV; Standing  -     Saline lock IV; Standing  -     Vital Signs; Standing  -     Pulse oximetry, spot; Standing  -     Apply sequential compression device; Standing  -     Full code; Standing  -     ceFAZolin (Ancef) 2 g in dextrose (iso)  mL    74-year-old male with cholelithiasis and symptoms consistent with chronic cholecystitis.  Symptoms have improved, but are not completely gone.  The patient is now agreeable to laparoscopic cholecystectomy with cholangiogram with possible open operation.  I again discussed the procedure and risks with the patient and his son. The risks include bleeding, infection, bile leak, injury to surrounding structures such as the common bile duct/duodenum, cardiac/pulmonary/renal complications, post op diarrhea.  Electronic consent obtained.  I have told the patient to follow-up with PCP regarding left kidney lesion.         Brent Johnson MD         [1] No family history on file.

## 2025-07-01 ENCOUNTER — TELEPHONE (OUTPATIENT)
Dept: SURGERY | Facility: CLINIC | Age: 75
End: 2025-07-01
Payer: MEDICARE

## 2025-07-01 ENCOUNTER — ANESTHESIA EVENT (OUTPATIENT)
Dept: OPERATING ROOM | Facility: HOSPITAL | Age: 75
End: 2025-07-01
Payer: MEDICARE

## 2025-07-01 ENCOUNTER — APPOINTMENT (OUTPATIENT)
Dept: CARDIOLOGY | Facility: HOSPITAL | Age: 75
End: 2025-07-01
Payer: MEDICARE

## 2025-07-01 ENCOUNTER — APPOINTMENT (OUTPATIENT)
Dept: RADIOLOGY | Facility: HOSPITAL | Age: 75
End: 2025-07-01
Payer: MEDICARE

## 2025-07-01 ENCOUNTER — ANESTHESIA (OUTPATIENT)
Dept: OPERATING ROOM | Facility: HOSPITAL | Age: 75
End: 2025-07-01
Payer: MEDICARE

## 2025-07-01 ENCOUNTER — HOSPITAL ENCOUNTER (INPATIENT)
Facility: HOSPITAL | Age: 75
LOS: 2 days | Discharge: HOME | End: 2025-07-03
Attending: EMERGENCY MEDICINE | Admitting: SURGERY
Payer: MEDICARE

## 2025-07-01 DIAGNOSIS — K81.1 CHRONIC CHOLECYSTITIS: ICD-10-CM

## 2025-07-01 DIAGNOSIS — R10.11 RUQ PAIN: ICD-10-CM

## 2025-07-01 DIAGNOSIS — K80.10 CALCULUS OF GALLBLADDER WITH CHRONIC CHOLECYSTITIS WITHOUT OBSTRUCTION: Primary | ICD-10-CM

## 2025-07-01 DIAGNOSIS — K81.9 CHOLECYSTITIS: ICD-10-CM

## 2025-07-01 LAB
ALBUMIN SERPL BCP-MCNC: 3.9 G/DL (ref 3.4–5)
ALP SERPL-CCNC: 107 U/L (ref 33–136)
ALT SERPL W P-5'-P-CCNC: 29 U/L (ref 10–52)
ANION GAP SERPL CALC-SCNC: 15 MMOL/L (ref 10–20)
AST SERPL W P-5'-P-CCNC: 18 U/L (ref 9–39)
BASOPHILS # BLD AUTO: 0.09 X10*3/UL (ref 0–0.1)
BASOPHILS NFR BLD AUTO: 0.7 %
BILIRUB DIRECT SERPL-MCNC: 0.1 MG/DL (ref 0–0.3)
BILIRUB SERPL-MCNC: 0.9 MG/DL (ref 0–1.2)
BUN SERPL-MCNC: 13 MG/DL (ref 6–23)
CALCIUM SERPL-MCNC: 9.2 MG/DL (ref 8.6–10.3)
CHLORIDE SERPL-SCNC: 103 MMOL/L (ref 98–107)
CO2 SERPL-SCNC: 23 MMOL/L (ref 21–32)
CREAT SERPL-MCNC: 0.87 MG/DL (ref 0.5–1.3)
EGFRCR SERPLBLD CKD-EPI 2021: >90 ML/MIN/1.73M*2
EOSINOPHIL # BLD AUTO: 0.12 X10*3/UL (ref 0–0.4)
EOSINOPHIL NFR BLD AUTO: 0.9 %
ERYTHROCYTE [DISTWIDTH] IN BLOOD BY AUTOMATED COUNT: 12.8 % (ref 11.5–14.5)
GLUCOSE SERPL-MCNC: 98 MG/DL (ref 74–99)
HCT VFR BLD AUTO: 44.2 % (ref 41–52)
HGB BLD-MCNC: 14.2 G/DL (ref 13.5–17.5)
IMM GRANULOCYTES # BLD AUTO: 0.28 X10*3/UL (ref 0–0.5)
IMM GRANULOCYTES NFR BLD AUTO: 2.1 % (ref 0–0.9)
LACTATE SERPL-SCNC: 1.2 MMOL/L (ref 0.4–2)
LIPASE SERPL-CCNC: 41 U/L (ref 9–82)
LYMPHOCYTES # BLD AUTO: 1.08 X10*3/UL (ref 0.8–3)
LYMPHOCYTES NFR BLD AUTO: 8 %
MAGNESIUM SERPL-MCNC: 2.22 MG/DL (ref 1.6–2.4)
MCH RBC QN AUTO: 29.2 PG (ref 26–34)
MCHC RBC AUTO-ENTMCNC: 32.1 G/DL (ref 32–36)
MCV RBC AUTO: 91 FL (ref 80–100)
MONOCYTES # BLD AUTO: 0.46 X10*3/UL (ref 0.05–0.8)
MONOCYTES NFR BLD AUTO: 3.4 %
NEUTROPHILS # BLD AUTO: 11.39 X10*3/UL (ref 1.6–5.5)
NEUTROPHILS NFR BLD AUTO: 84.9 %
NRBC BLD-RTO: 0 /100 WBCS (ref 0–0)
PLATELET # BLD AUTO: 434 X10*3/UL (ref 150–450)
POTASSIUM SERPL-SCNC: 4.5 MMOL/L (ref 3.5–5.3)
PROT SERPL-MCNC: 7.4 G/DL (ref 6.4–8.2)
RBC # BLD AUTO: 4.86 X10*6/UL (ref 4.5–5.9)
SODIUM SERPL-SCNC: 136 MMOL/L (ref 136–145)
WBC # BLD AUTO: 13.4 X10*3/UL (ref 4.4–11.3)

## 2025-07-01 PROCEDURE — 83690 ASSAY OF LIPASE: CPT | Performed by: EMERGENCY MEDICINE

## 2025-07-01 PROCEDURE — 2780000003 HC OR 278 NO HCPCS: Performed by: SURGERY

## 2025-07-01 PROCEDURE — 88304 TISSUE EXAM BY PATHOLOGIST: CPT | Mod: TC,PARLAB | Performed by: SURGERY

## 2025-07-01 PROCEDURE — 3600000009 HC OR TIME - EACH INCREMENTAL 1 MINUTE - PROCEDURE LEVEL FOUR: Performed by: SURGERY

## 2025-07-01 PROCEDURE — 2500000004 HC RX 250 GENERAL PHARMACY W/ HCPCS (ALT 636 FOR OP/ED)

## 2025-07-01 PROCEDURE — 7100000001 HC RECOVERY ROOM TIME - INITIAL BASE CHARGE: Performed by: SURGERY

## 2025-07-01 PROCEDURE — A47563 PR LAP,CHOLECYSTECTOMY/GRAPH: Performed by: NURSE ANESTHETIST, CERTIFIED REGISTERED

## 2025-07-01 PROCEDURE — 96365 THER/PROPH/DIAG IV INF INIT: CPT

## 2025-07-01 PROCEDURE — 93005 ELECTROCARDIOGRAM TRACING: CPT

## 2025-07-01 PROCEDURE — 2550000001 HC RX 255 CONTRASTS: Mod: JW | Performed by: SURGERY

## 2025-07-01 PROCEDURE — 99140 ANES COMP EMERGENCY COND: CPT | Performed by: ANESTHESIOLOGY

## 2025-07-01 PROCEDURE — 1210000001 HC SEMI-PRIVATE ROOM DAILY

## 2025-07-01 PROCEDURE — 83605 ASSAY OF LACTIC ACID: CPT | Performed by: EMERGENCY MEDICINE

## 2025-07-01 PROCEDURE — 2500000004 HC RX 250 GENERAL PHARMACY W/ HCPCS (ALT 636 FOR OP/ED): Performed by: EMERGENCY MEDICINE

## 2025-07-01 PROCEDURE — 47563 LAPARO CHOLECYSTECTOMY/GRAPH: CPT | Performed by: SURGERY

## 2025-07-01 PROCEDURE — 80048 BASIC METABOLIC PNL TOTAL CA: CPT | Performed by: EMERGENCY MEDICINE

## 2025-07-01 PROCEDURE — 2500000004 HC RX 250 GENERAL PHARMACY W/ HCPCS (ALT 636 FOR OP/ED): Mod: JZ | Performed by: ANESTHESIOLOGY

## 2025-07-01 PROCEDURE — 76705 ECHO EXAM OF ABDOMEN: CPT

## 2025-07-01 PROCEDURE — 85025 COMPLETE CBC W/AUTO DIFF WBC: CPT | Performed by: EMERGENCY MEDICINE

## 2025-07-01 PROCEDURE — 76705 ECHO EXAM OF ABDOMEN: CPT | Mod: FOREIGN READ | Performed by: RADIOLOGY

## 2025-07-01 PROCEDURE — 2500000004 HC RX 250 GENERAL PHARMACY W/ HCPCS (ALT 636 FOR OP/ED): Performed by: SURGERY

## 2025-07-01 PROCEDURE — 2500000004 HC RX 250 GENERAL PHARMACY W/ HCPCS (ALT 636 FOR OP/ED): Performed by: REGISTERED NURSE

## 2025-07-01 PROCEDURE — 3700000002 HC GENERAL ANESTHESIA TIME - EACH INCREMENTAL 1 MINUTE: Performed by: SURGERY

## 2025-07-01 PROCEDURE — 83735 ASSAY OF MAGNESIUM: CPT | Performed by: EMERGENCY MEDICINE

## 2025-07-01 PROCEDURE — BF131ZZ FLUOROSCOPY OF GALLBLADDER AND BILE DUCTS USING LOW OSMOLAR CONTRAST: ICD-10-PCS | Performed by: SURGERY

## 2025-07-01 PROCEDURE — 2720000007 HC OR 272 NO HCPCS: Performed by: SURGERY

## 2025-07-01 PROCEDURE — 80053 COMPREHEN METABOLIC PANEL: CPT | Performed by: EMERGENCY MEDICINE

## 2025-07-01 PROCEDURE — 99100 ANES PT EXTEME AGE<1 YR&>70: CPT | Performed by: ANESTHESIOLOGY

## 2025-07-01 PROCEDURE — 2500000001 HC RX 250 WO HCPCS SELF ADMINISTERED DRUGS (ALT 637 FOR MEDICARE OP): Performed by: REGISTERED NURSE

## 2025-07-01 PROCEDURE — 82248 BILIRUBIN DIRECT: CPT | Performed by: EMERGENCY MEDICINE

## 2025-07-01 PROCEDURE — 3600000004 HC OR TIME - INITIAL BASE CHARGE - PROCEDURE LEVEL FOUR: Performed by: SURGERY

## 2025-07-01 PROCEDURE — 87077 CULTURE AEROBIC IDENTIFY: CPT | Mod: PARLAB | Performed by: SURGERY

## 2025-07-01 PROCEDURE — 99285 EMERGENCY DEPT VISIT HI MDM: CPT | Mod: 25 | Performed by: EMERGENCY MEDICINE

## 2025-07-01 PROCEDURE — 7100000002 HC RECOVERY ROOM TIME - EACH INCREMENTAL 1 MINUTE: Performed by: SURGERY

## 2025-07-01 PROCEDURE — 0FT44ZZ RESECTION OF GALLBLADDER, PERCUTANEOUS ENDOSCOPIC APPROACH: ICD-10-PCS | Performed by: SURGERY

## 2025-07-01 PROCEDURE — 3700000001 HC GENERAL ANESTHESIA TIME - INITIAL BASE CHARGE: Performed by: SURGERY

## 2025-07-01 PROCEDURE — 74300 X-RAY BILE DUCTS/PANCREAS: CPT | Performed by: SURGERY

## 2025-07-01 PROCEDURE — A47563 PR LAP,CHOLECYSTECTOMY/GRAPH: Performed by: ANESTHESIOLOGY

## 2025-07-01 PROCEDURE — 36415 COLL VENOUS BLD VENIPUNCTURE: CPT | Performed by: EMERGENCY MEDICINE

## 2025-07-01 PROCEDURE — 76000 FLUOROSCOPY <1 HR PHYS/QHP: CPT

## 2025-07-01 PROCEDURE — C1729 CATH, DRAINAGE: HCPCS | Performed by: SURGERY

## 2025-07-01 PROCEDURE — 96375 TX/PRO/DX INJ NEW DRUG ADDON: CPT

## 2025-07-01 PROCEDURE — 2500000004 HC RX 250 GENERAL PHARMACY W/ HCPCS (ALT 636 FOR OP/ED): Performed by: NURSE ANESTHETIST, CERTIFIED REGISTERED

## 2025-07-01 RX ORDER — PANTOPRAZOLE SODIUM 40 MG/1
40 TABLET, DELAYED RELEASE ORAL
Status: DISCONTINUED | OUTPATIENT
Start: 2025-07-02 | End: 2025-07-03 | Stop reason: HOSPADM

## 2025-07-01 RX ORDER — LIDOCAINE HCL/PF 100 MG/5ML
SYRINGE (ML) INTRAVENOUS AS NEEDED
Status: DISCONTINUED | OUTPATIENT
Start: 2025-07-01 | End: 2025-07-01

## 2025-07-01 RX ORDER — ALBUTEROL SULFATE 90 UG/1
2 INHALANT RESPIRATORY (INHALATION) EVERY 6 HOURS PRN
Status: DISCONTINUED | OUTPATIENT
Start: 2025-07-01 | End: 2025-07-01

## 2025-07-01 RX ORDER — HYDROCHLOROTHIAZIDE 25 MG/1
25 TABLET ORAL DAILY
Status: DISCONTINUED | OUTPATIENT
Start: 2025-07-02 | End: 2025-07-03 | Stop reason: HOSPADM

## 2025-07-01 RX ORDER — MORPHINE SULFATE 2 MG/ML
2 INJECTION, SOLUTION INTRAMUSCULAR; INTRAVENOUS EVERY 4 HOURS PRN
Status: DISCONTINUED | OUTPATIENT
Start: 2025-07-01 | End: 2025-07-03 | Stop reason: HOSPADM

## 2025-07-01 RX ORDER — HEPARIN SODIUM 5000 [USP'U]/ML
5000 INJECTION, SOLUTION INTRAVENOUS; SUBCUTANEOUS EVERY 8 HOURS
Status: DISCONTINUED | OUTPATIENT
Start: 2025-07-01 | End: 2025-07-03 | Stop reason: HOSPADM

## 2025-07-01 RX ORDER — BUPIVACAINE HYDROCHLORIDE 5 MG/ML
INJECTION, SOLUTION EPIDURAL; INTRACAUDAL; PERINEURAL AS NEEDED
Status: DISCONTINUED | OUTPATIENT
Start: 2025-07-01 | End: 2025-07-01 | Stop reason: HOSPADM

## 2025-07-01 RX ORDER — CEFAZOLIN SODIUM 2 G/50ML
2 SOLUTION INTRAVENOUS ONCE
Status: COMPLETED | OUTPATIENT
Start: 2025-07-01 | End: 2025-07-01

## 2025-07-01 RX ORDER — NALOXONE HYDROCHLORIDE 1 MG/ML
0.2 INJECTION INTRAMUSCULAR; INTRAVENOUS; SUBCUTANEOUS EVERY 5 MIN PRN
Status: DISCONTINUED | OUTPATIENT
Start: 2025-07-01 | End: 2025-07-03 | Stop reason: HOSPADM

## 2025-07-01 RX ORDER — GABAPENTIN 300 MG/1
300 CAPSULE ORAL 3 TIMES DAILY
Status: DISCONTINUED | OUTPATIENT
Start: 2025-07-01 | End: 2025-07-03 | Stop reason: HOSPADM

## 2025-07-01 RX ORDER — AMLODIPINE BESYLATE 10 MG/1
10 TABLET ORAL DAILY
Status: DISCONTINUED | OUTPATIENT
Start: 2025-07-02 | End: 2025-07-03 | Stop reason: HOSPADM

## 2025-07-01 RX ORDER — ONDANSETRON HYDROCHLORIDE 2 MG/ML
4 INJECTION, SOLUTION INTRAVENOUS ONCE AS NEEDED
Status: DISCONTINUED | OUTPATIENT
Start: 2025-07-01 | End: 2025-07-01 | Stop reason: HOSPADM

## 2025-07-01 RX ORDER — ALBUTEROL SULFATE 0.83 MG/ML
2.5 SOLUTION RESPIRATORY (INHALATION) ONCE AS NEEDED
Status: DISCONTINUED | OUTPATIENT
Start: 2025-07-01 | End: 2025-07-01 | Stop reason: HOSPADM

## 2025-07-01 RX ORDER — MORPHINE SULFATE 4 MG/ML
4 INJECTION, SOLUTION INTRAMUSCULAR; INTRAVENOUS EVERY 4 HOURS PRN
Status: DISCONTINUED | OUTPATIENT
Start: 2025-07-01 | End: 2025-07-03 | Stop reason: HOSPADM

## 2025-07-01 RX ORDER — ACETAMINOPHEN 325 MG/1
650 TABLET ORAL EVERY 6 HOURS
Status: DISCONTINUED | OUTPATIENT
Start: 2025-07-01 | End: 2025-07-03 | Stop reason: HOSPADM

## 2025-07-01 RX ORDER — ATORVASTATIN CALCIUM 20 MG/1
20 TABLET, FILM COATED ORAL DAILY
Status: DISCONTINUED | OUTPATIENT
Start: 2025-07-01 | End: 2025-07-03 | Stop reason: HOSPADM

## 2025-07-01 RX ORDER — SODIUM CHLORIDE 9 MG/ML
50 INJECTION, SOLUTION INTRAVENOUS CONTINUOUS
Status: DISCONTINUED | OUTPATIENT
Start: 2025-07-01 | End: 2025-07-02

## 2025-07-01 RX ORDER — ONDANSETRON HYDROCHLORIDE 2 MG/ML
INJECTION, SOLUTION INTRAVENOUS AS NEEDED
Status: DISCONTINUED | OUTPATIENT
Start: 2025-07-01 | End: 2025-07-01

## 2025-07-01 RX ORDER — ROCURONIUM BROMIDE 10 MG/ML
INJECTION, SOLUTION INTRAVENOUS AS NEEDED
Status: DISCONTINUED | OUTPATIENT
Start: 2025-07-01 | End: 2025-07-01

## 2025-07-01 RX ORDER — ONDANSETRON HYDROCHLORIDE 2 MG/ML
4 INJECTION, SOLUTION INTRAVENOUS ONCE
Status: COMPLETED | OUTPATIENT
Start: 2025-07-01 | End: 2025-07-01

## 2025-07-01 RX ORDER — MEPERIDINE HYDROCHLORIDE 50 MG/ML
12.5 INJECTION INTRAMUSCULAR; INTRAVENOUS; SUBCUTANEOUS EVERY 10 MIN PRN
Status: DISCONTINUED | OUTPATIENT
Start: 2025-07-01 | End: 2025-07-01 | Stop reason: HOSPADM

## 2025-07-01 RX ORDER — ACETAMINOPHEN 325 MG/1
650 TABLET ORAL EVERY 4 HOURS PRN
Status: DISCONTINUED | OUTPATIENT
Start: 2025-07-01 | End: 2025-07-01 | Stop reason: HOSPADM

## 2025-07-01 RX ORDER — DEXAMETHASONE SODIUM PHOSPHATE 10 MG/ML
6 INJECTION INTRAMUSCULAR; INTRAVENOUS ONCE
Status: DISCONTINUED | OUTPATIENT
Start: 2025-07-01 | End: 2025-07-01 | Stop reason: HOSPADM

## 2025-07-01 RX ORDER — FENTANYL CITRATE 50 UG/ML
INJECTION, SOLUTION INTRAMUSCULAR; INTRAVENOUS AS NEEDED
Status: DISCONTINUED | OUTPATIENT
Start: 2025-07-01 | End: 2025-07-01

## 2025-07-01 RX ORDER — PROPOFOL 10 MG/ML
INJECTION, EMULSION INTRAVENOUS AS NEEDED
Status: DISCONTINUED | OUTPATIENT
Start: 2025-07-01 | End: 2025-07-01

## 2025-07-01 RX ORDER — ALBUTEROL SULFATE 90 UG/1
2 INHALANT RESPIRATORY (INHALATION) EVERY 2 HOUR PRN
Status: DISCONTINUED | OUTPATIENT
Start: 2025-07-01 | End: 2025-07-03 | Stop reason: HOSPADM

## 2025-07-01 RX ADMIN — SUGAMMADEX 200 MG: 100 INJECTION, SOLUTION INTRAVENOUS at 18:14

## 2025-07-01 RX ADMIN — GABAPENTIN 300 MG: 300 CAPSULE ORAL at 20:52

## 2025-07-01 RX ADMIN — ROCURONIUM BROMIDE 50 MG: 10 INJECTION, SOLUTION INTRAVENOUS at 15:57

## 2025-07-01 RX ADMIN — DEXAMETHASONE SODIUM PHOSPHATE 4 MG: 4 INJECTION, SOLUTION INTRAMUSCULAR; INTRAVENOUS at 16:14

## 2025-07-01 RX ADMIN — SODIUM CHLORIDE 50 ML/HR: 0.9 INJECTION, SOLUTION INTRAVENOUS at 20:54

## 2025-07-01 RX ADMIN — HYDROMORPHONE HYDROCHLORIDE 0.5 MG: 1 INJECTION, SOLUTION INTRAMUSCULAR; INTRAVENOUS; SUBCUTANEOUS at 18:48

## 2025-07-01 RX ADMIN — ONDANSETRON 4 MG: 2 INJECTION INTRAMUSCULAR; INTRAVENOUS at 18:00

## 2025-07-01 RX ADMIN — ONDANSETRON 4 MG: 2 INJECTION INTRAMUSCULAR; INTRAVENOUS at 11:33

## 2025-07-01 RX ADMIN — PIPERACILLIN SODIUM AND TAZOBACTAM SODIUM 3.38 G: 3; .375 INJECTION, SOLUTION INTRAVENOUS at 22:03

## 2025-07-01 RX ADMIN — FENTANYL CITRATE 100 MCG: 50 INJECTION, SOLUTION INTRAMUSCULAR; INTRAVENOUS at 15:57

## 2025-07-01 RX ADMIN — HEPARIN SODIUM 5000 UNITS: 5000 INJECTION, SOLUTION INTRAVENOUS; SUBCUTANEOUS at 20:52

## 2025-07-01 RX ADMIN — CEFAZOLIN SODIUM 2 G: 2 SOLUTION INTRAVENOUS at 20:52

## 2025-07-01 RX ADMIN — SODIUM CHLORIDE, POTASSIUM CHLORIDE, SODIUM LACTATE AND CALCIUM CHLORIDE: 600; 310; 30; 20 INJECTION, SOLUTION INTRAVENOUS at 15:50

## 2025-07-01 RX ADMIN — LIDOCAINE HYDROCHLORIDE 60 MG: 20 INJECTION INTRAVENOUS at 15:57

## 2025-07-01 RX ADMIN — ACETAMINOPHEN 650 MG: 325 TABLET ORAL at 20:52

## 2025-07-01 RX ADMIN — PIPERACILLIN SODIUM AND TAZOBACTAM SODIUM 3.38 G: 3; .375 INJECTION, SOLUTION INTRAVENOUS at 14:38

## 2025-07-01 RX ADMIN — PROPOFOL 200 MG: 10 INJECTION, EMULSION INTRAVENOUS at 15:57

## 2025-07-01 SDOH — SOCIAL STABILITY: SOCIAL INSECURITY: ARE YOU OR HAVE YOU BEEN THREATENED OR ABUSED PHYSICALLY, EMOTIONALLY, OR SEXUALLY BY ANYONE?: NO

## 2025-07-01 SDOH — HEALTH STABILITY: PHYSICAL HEALTH: ON AVERAGE, HOW MANY MINUTES DO YOU ENGAGE IN EXERCISE AT THIS LEVEL?: 0 MIN

## 2025-07-01 SDOH — SOCIAL STABILITY: SOCIAL INSECURITY: ARE THERE ANY APPARENT SIGNS OF INJURIES/BEHAVIORS THAT COULD BE RELATED TO ABUSE/NEGLECT?: NO

## 2025-07-01 SDOH — SOCIAL STABILITY: SOCIAL INSECURITY
WITHIN THE LAST YEAR, HAVE YOU BEEN RAPED OR FORCED TO HAVE ANY KIND OF SEXUAL ACTIVITY BY YOUR PARTNER OR EX-PARTNER?: NO

## 2025-07-01 SDOH — ECONOMIC STABILITY: INCOME INSECURITY: IN THE PAST 12 MONTHS HAS THE ELECTRIC, GAS, OIL, OR WATER COMPANY THREATENED TO SHUT OFF SERVICES IN YOUR HOME?: NO

## 2025-07-01 SDOH — HEALTH STABILITY: MENTAL HEALTH: HOW OFTEN DO YOU HAVE SIX OR MORE DRINKS ON ONE OCCASION?: NEVER

## 2025-07-01 SDOH — SOCIAL STABILITY: SOCIAL INSECURITY
WITHIN THE LAST YEAR, HAVE YOU BEEN KICKED, HIT, SLAPPED, OR OTHERWISE PHYSICALLY HURT BY YOUR PARTNER OR EX-PARTNER?: NO

## 2025-07-01 SDOH — ECONOMIC STABILITY: HOUSING INSECURITY: AT ANY TIME IN THE PAST 12 MONTHS, WERE YOU HOMELESS OR LIVING IN A SHELTER (INCLUDING NOW)?: NO

## 2025-07-01 SDOH — ECONOMIC STABILITY: FOOD INSECURITY: WITHIN THE PAST 12 MONTHS, YOU WORRIED THAT YOUR FOOD WOULD RUN OUT BEFORE YOU GOT THE MONEY TO BUY MORE.: NEVER TRUE

## 2025-07-01 SDOH — HEALTH STABILITY: MENTAL HEALTH: HOW OFTEN DO YOU HAVE A DRINK CONTAINING ALCOHOL?: NEVER

## 2025-07-01 SDOH — SOCIAL STABILITY: SOCIAL INSECURITY: WITHIN THE LAST YEAR, HAVE YOU BEEN HUMILIATED OR EMOTIONALLY ABUSED IN OTHER WAYS BY YOUR PARTNER OR EX-PARTNER?: NO

## 2025-07-01 SDOH — ECONOMIC STABILITY: FOOD INSECURITY: WITHIN THE PAST 12 MONTHS, THE FOOD YOU BOUGHT JUST DIDN'T LAST AND YOU DIDN'T HAVE MONEY TO GET MORE.: NEVER TRUE

## 2025-07-01 SDOH — SOCIAL STABILITY: SOCIAL INSECURITY: DO YOU FEEL UNSAFE GOING BACK TO THE PLACE WHERE YOU ARE LIVING?: NO

## 2025-07-01 SDOH — HEALTH STABILITY: MENTAL HEALTH: HOW MANY DRINKS CONTAINING ALCOHOL DO YOU HAVE ON A TYPICAL DAY WHEN YOU ARE DRINKING?: PATIENT DOES NOT DRINK

## 2025-07-01 SDOH — SOCIAL STABILITY: SOCIAL INSECURITY: WITHIN THE LAST YEAR, HAVE YOU BEEN AFRAID OF YOUR PARTNER OR EX-PARTNER?: NO

## 2025-07-01 SDOH — ECONOMIC STABILITY: HOUSING INSECURITY: IN THE LAST 12 MONTHS, WAS THERE A TIME WHEN YOU WERE NOT ABLE TO PAY THE MORTGAGE OR RENT ON TIME?: NO

## 2025-07-01 SDOH — ECONOMIC STABILITY: HOUSING INSECURITY: IN THE PAST 12 MONTHS, HOW MANY TIMES HAVE YOU MOVED WHERE YOU WERE LIVING?: 0

## 2025-07-01 SDOH — SOCIAL STABILITY: SOCIAL INSECURITY: ABUSE: ADULT

## 2025-07-01 SDOH — SOCIAL STABILITY: SOCIAL INSECURITY: HAVE YOU HAD THOUGHTS OF HARMING ANYONE ELSE?: NO

## 2025-07-01 SDOH — HEALTH STABILITY: PHYSICAL HEALTH: ON AVERAGE, HOW MANY DAYS PER WEEK DO YOU ENGAGE IN MODERATE TO STRENUOUS EXERCISE (LIKE A BRISK WALK)?: 0 DAYS

## 2025-07-01 SDOH — SOCIAL STABILITY: SOCIAL INSECURITY: DOES ANYONE TRY TO KEEP YOU FROM HAVING/CONTACTING OTHER FRIENDS OR DOING THINGS OUTSIDE YOUR HOME?: NO

## 2025-07-01 SDOH — ECONOMIC STABILITY: FOOD INSECURITY: HOW HARD IS IT FOR YOU TO PAY FOR THE VERY BASICS LIKE FOOD, HOUSING, MEDICAL CARE, AND HEATING?: NOT HARD AT ALL

## 2025-07-01 SDOH — SOCIAL STABILITY: SOCIAL INSECURITY: DO YOU FEEL ANYONE HAS EXPLOITED OR TAKEN ADVANTAGE OF YOU FINANCIALLY OR OF YOUR PERSONAL PROPERTY?: NO

## 2025-07-01 SDOH — SOCIAL STABILITY: SOCIAL INSECURITY: HAVE YOU HAD ANY THOUGHTS OF HARMING ANYONE ELSE?: NO

## 2025-07-01 SDOH — SOCIAL STABILITY: SOCIAL INSECURITY: HAS ANYONE EVER THREATENED TO HURT YOUR FAMILY OR YOUR PETS?: NO

## 2025-07-01 SDOH — ECONOMIC STABILITY: TRANSPORTATION INSECURITY: IN THE PAST 12 MONTHS, HAS LACK OF TRANSPORTATION KEPT YOU FROM MEDICAL APPOINTMENTS OR FROM GETTING MEDICATIONS?: NO

## 2025-07-01 SDOH — SOCIAL STABILITY: SOCIAL INSECURITY: WERE YOU ABLE TO COMPLETE ALL THE BEHAVIORAL HEALTH SCREENINGS?: YES

## 2025-07-01 SDOH — HEALTH STABILITY: MENTAL HEALTH: CURRENT SMOKER: 0

## 2025-07-01 ASSESSMENT — ENCOUNTER SYMPTOMS
COUGH: 0
ABDOMINAL PAIN: 1
NAUSEA: 1
ACTIVITY CHANGE: 0
DIFFICULTY URINATING: 0
FEVER: 0
VOMITING: 0
APPETITE CHANGE: 1
ABDOMINAL DISTENTION: 0
SHORTNESS OF BREATH: 0
CHEST TIGHTNESS: 0
UNEXPECTED WEIGHT CHANGE: 1
CHILLS: 0

## 2025-07-01 ASSESSMENT — COGNITIVE AND FUNCTIONAL STATUS - GENERAL
MOVING TO AND FROM BED TO CHAIR: A LITTLE
WALKING IN HOSPITAL ROOM: A LITTLE
MOBILITY SCORE: 17
TOILETING: A LITTLE
DRESSING REGULAR UPPER BODY CLOTHING: A LITTLE
DRESSING REGULAR LOWER BODY CLOTHING: A LITTLE
HELP NEEDED FOR BATHING: A LITTLE
STANDING UP FROM CHAIR USING ARMS: A LITTLE
DAILY ACTIVITIY SCORE: 20
MOVING FROM LYING ON BACK TO SITTING ON SIDE OF FLAT BED WITH BEDRAILS: A LITTLE
PATIENT BASELINE BEDBOUND: NO
CLIMB 3 TO 5 STEPS WITH RAILING: A LOT
TURNING FROM BACK TO SIDE WHILE IN FLAT BAD: A LITTLE

## 2025-07-01 ASSESSMENT — PAIN DESCRIPTION - DESCRIPTORS
DESCRIPTORS: SHARP
DESCRIPTORS: SHARP;DULL

## 2025-07-01 ASSESSMENT — PAIN DESCRIPTION - LOCATION: LOCATION: ABDOMEN

## 2025-07-01 ASSESSMENT — PAIN SCALES - GENERAL
PAIN_LEVEL: 0
PAINLEVEL_OUTOF10: 7
PAINLEVEL_OUTOF10: 7
PAINLEVEL_OUTOF10: 6

## 2025-07-01 ASSESSMENT — ACTIVITIES OF DAILY LIVING (ADL)
FEEDING YOURSELF: INDEPENDENT
TOILETING: NEEDS ASSISTANCE
BATHING: NEEDS ASSISTANCE
HEARING - RIGHT EAR: FUNCTIONAL
WALKS IN HOME: NEEDS ASSISTANCE
HEARING - LEFT EAR: FUNCTIONAL
JUDGMENT_ADEQUATE_SAFELY_COMPLETE_DAILY_ACTIVITIES: YES
DRESSING YOURSELF: NEEDS ASSISTANCE
PATIENT'S MEMORY ADEQUATE TO SAFELY COMPLETE DAILY ACTIVITIES?: YES
GROOMING: NEEDS ASSISTANCE
ASSISTIVE_DEVICE: CRUTCHES
ADEQUATE_TO_COMPLETE_ADL: YES
LACK_OF_TRANSPORTATION: NO

## 2025-07-01 ASSESSMENT — PATIENT HEALTH QUESTIONNAIRE - PHQ9
1. LITTLE INTEREST OR PLEASURE IN DOING THINGS: NOT AT ALL
2. FEELING DOWN, DEPRESSED OR HOPELESS: NOT AT ALL
SUM OF ALL RESPONSES TO PHQ9 QUESTIONS 1 & 2: 0

## 2025-07-01 ASSESSMENT — LIFESTYLE VARIABLES
AUDIT-C TOTAL SCORE: 0
SKIP TO QUESTIONS 9-10: 1

## 2025-07-01 ASSESSMENT — PAIN - FUNCTIONAL ASSESSMENT
PAIN_FUNCTIONAL_ASSESSMENT: 0-10
PAIN_FUNCTIONAL_ASSESSMENT: 0-10

## 2025-07-01 ASSESSMENT — PAIN DESCRIPTION - ORIENTATION: ORIENTATION: UPPER

## 2025-07-01 ASSESSMENT — PAIN DESCRIPTION - PAIN TYPE: TYPE: ACUTE PAIN

## 2025-07-01 NOTE — OP NOTE
LAPAROSCOPIC CHOLECYSTECTOMY WITH CHOLANGIOGRAM WITH C-ARM Operative Note     Date: 2025  OR Location: [facility]    Name: Edwin Mayorga, : 1950, Age: 74 y.o., MRN: [id number], Sex: male    Diagnosis  Pre-op Diagnosis      * Calculus of gallbladder with chronic cholecystitis without obstruction [K80.10] Post-op Diagnosis     * Calculus of gallbladder with chronic cholecystitis without obstruction [K80.10]     Procedures  LAPAROSCOPIC CHOLECYSTECTOMY WITH CHOLANGIOGRAM WITH C-ARM  03416 - MS LAPS SURG CHOLECYSTECTOMY W/CHOLANGIOGRAPHY      Surgeons      * Brent Johnson - Primary    Resident/Fellow/Other Assistant:  Surgeons and Role:  * No surgeons found with a matching role *    Staff:   Circulator: Anjali  Surgical Assistant: Radha Arrington Person: [place] Arrington Person: Ross  Circulator: Araseli  Circulator: Prema  Surgical Assistant: Yolanda Jimenez Circulator: Mike Jimenez Scrub: [place] Jimenez Circulator: Rut  Surgical Assistant: Avtar    Anesthesia Staff: Anesthesiologist: Gen Howe MD  CRNA: JACOBY Armijo-CRNA    Procedure Summary  Anesthesia: General  ASA: III  Estimated Blood Loss: 25mL  Intra-op Medications: * Intraprocedure medication information is unavailable because the case start and end events have not been set *           Anesthesia Record               Intraprocedure I/O Totals          Intake    Dexmedetomidine 0.00 mL    The total shown is the total volume documented since Anesthesia Start was filed.    Total Intake 0 mL       Output    Urine 10 mL    Total Output 10 mL       Net    Net Volume -10 mL          Specimen:   ID Type Source Tests Collected by Time   1 : GALLBLADDER Tissue GALLBLADDER CHOLECYSTECTOMY SURGICAL PATHOLOGY EXAM Brent Johnson MD 2025 1755   A : GALLBLADDER FLUID Swab GALLBLADDER CHOLECYSTECTOMY TISSUE/WOUND CULTURE/SMEAR Brent Johnson MD 2025 1649                 Drains and/or Catheters:   Closed/Suction Drain RUQ Bulb 19 Fr.  (Active)       Tourniquet Times:         Implants:     Findings: Cholelithiasis with acute gangrenous cholecystitis, gallbladder empyema and pericholecystic abscess    Indications: Edwin Mayorga is an 74 y.o. male who is having surgery for Calculus of gallbladder with chronic cholecystitis without obstruction [K80.10].     The patient was seen in the preoperative area. The risks, benefits, complications, treatment options, non-operative alternatives, expected recovery and outcomes were discussed with the patient. The possibilities of reaction to medication, pulmonary aspiration, injury to surrounding structures, bleeding, recurrent infection, the need for additional procedures, failure to diagnose a condition, and creating a complication requiring transfusion or operation were discussed with the patient. The patient concurred with the proposed plan, giving informed consent.  The site of surgery was properly noted/marked if necessary per policy. The patient has been actively warmed in preoperative area. Preoperative antibiotics have been ordered and given within 1 hours of incision. Venous thrombosis prophylaxis have been ordered including bilateral sequential compression devices    Procedure Details: Following informed consent patient was taken to the op room.  Huddle was performed.  Patient was placed into supine position.  The patient was given general anesthetic.  Sequential compression devices are in place and functioning.  The patient was given Ancef IV.  The abdomen was prepped and draped in sterile fashion.  A timeout was performed.  An infraumbilical midline skin incision was made and extended through the subcutaneous tissue.  0 Vicryl sutures were placed laterally in the fascia.  The fascia was opened in the midline.  A blunt 12 mm port was inserted under direct vision and secured with 0 Vicryl sutures.  The balloon was inflated.  The abdomen was insufflated carbon oxide to a pressure of 12 mmHg.  The  scope was inserted.  Peritoneal cavity was inspected.  [The visualized portions of the stomach, small bowel, colon, omentum and liver were unremarkable].  The patient had extensive omental adhesions to the gallbladder and surrounding liver.  Two 5 mm ports were placed under direct vision one into the epigastrium and one into the right upper quadrant.  The omental adhesions to the liver and gallbladder were freed with sharp and blunt dissection.  The patient was found to have a pericholecystic abscess which was drained and cultured.  The gallbladder wall was necrotic.  The patient was found to have a gallbladder empyema.  Purulent fluid and numerous small stones were suctioned from the gallbladder.  The gallbladder was retracted and the triangle of Calot dissected.  The area was markedly inflamed.  The cystic duct and cystic artery were freed from surrounding tissue.  The posterior portion of the liver plate was freed from the gallbladder.  2 structures were seen entering the gallbladder, the cystic duct and cystic artery.  The cystic duct was clipped adjacent to the neck the gallbladder then opened and milked retrograde.  A cholangiocatheter was percutaneously inserted into the right upper quadrant through a 12-gauge Angiocath.  The cholangiocatheter was placed into the cystic duct and clipped into place.  Cholangiograms were performed which [appeared to be normal].  The clip and cholangiocatheter removed.  The cystic duct was triply clipped proximal with care being taken not to injure or occlude the common bile duct.  The cystic duct was divided between clips.  The cystic artery was triply clipped proximal, singly clipped distal and divided.  A posterior branch of the cystic artery was doubly clipped proximal, singly clipped distal and divided.  The gallbladder was freed from the liver with electrocautery and [placed into an Endo Catch bag and] temporarily stored in the omentum.  The right upper quadrant was  irrigated and suctioned till clear.  No gallstones were remaining within the area.  A 19 [place] round fluted drain was backloaded from the umbilical port to the right upper quadrant port.  The proximal portion of the drain was placed into the gallbladder fossa and the exit site secured with a 3-0 nylon.  A 5 mm scope was placed in the epigastric port and the gallbladder removed through the umbilical port without difficulty.  The fascia at the the umbilical port was closed with a running 0 Vicryl suture obtaining an airtight closure.  The previously placed lateral Vicryls were tied to each other.  The remaining ports were removed under direct vision and the abdomen deflated.  Half percent plain Marcaine was infiltrated into each of the fascial incisions and skin closed with running 4-0 Vicryl subcuticular sutures.  Dressings were placed.  The patient was taken to the recovery in satisfactory condition having tolerated procedure well. The gallbladder was sent to pathology.    Evidence of Infection: Yes; Pus Below the level of the fascia (organ/space)  Complications:  None; patient tolerated the procedure well.    Disposition: PACU - hemodynamically stable.  Condition: stable         Task Performed by RNFA or Surgical Assistant:  Camera direction, retraction, skin closure          Brent Johnson  Phone Number: [phone]

## 2025-07-01 NOTE — ED PROVIDER NOTES
HPI   Chief Complaint   Patient presents with    Abdominal Pain     PT. C/O UPPER ABD. PAIN. PT. STATES WAS SEEN FOR SAME LAST WEEK AND WAS TOLD HAD GALLBLADDER PROBLEM. PT. STATES PAIN GOT BETTER AND THEN STARTED TO GET WORSE AGAIN LAST NIGHT. +NAUSEA. LAST BM YESTERDAY. DENIES FEVERS.       HPI: 74-year-old male states that he has been having upper abdominal pain for a few weeks.  He was here on the 20th.  He had a workup was told that it was his gallbladder.  He states that he followed up with wish to have surgery.  He states that he followed back up with him yesterday and he scheduled him for surgery for 17 July.  He states that he thinks that that is too long to wait so he came in here today.  No new symptoms.  No new fevers or chills.  He states pain is the same.  No vomiting.  No diarrhea.  No blood in his urine blood in stools or dark tarry stools.    Family HX: Denies any significant/pertinent family history.  Social Hx: Denies ETOH or drug use.  Review of systems:  Gen.: No weight loss, fatigue, anorexia, insomnia, fever.   Eyes: No vision loss, double vision, drainage, eye pain.   ENT: No pharyngitis, dry mouth.   Cardiac: No chest pain, palpitations, syncope, near syncope.   Pulmonary: No shortness of breath, cough, hemoptysis.   Heme/lymph: No swollen glands, fever, bleeding.   GI: No abdominal pain, change in bowel habits, melena, hematemesis, hematochezia, nausea, vomiting, diarrhea.   : No discharge, dysuria, frequency, urgency, hematuria.   Musculoskeletal: No limb pain, joint pain, joint swelling.   Skin: No rashes.   Psych: No depression, anxiety, suicidality, homicidality.   Review of systems is otherwise negative unless stated above or in history of present illness.    Physical Exam:    Appearance: Alert, oriented , cooperative,  in no acute distress. Well nourished & well hydrated.    Skin: Intact,  dry skin, no lesions, rash, petechiae or purpura.     Eyes: PERRLA, EOMs intact,  Conjunctiva  pink with no redness or exudates. Eyelids without lesions. No scleral icterus.     ENT: Hearing grossly intact. External auditory canals patent, tympanic membranes intact with visible landmarks. Nares patent, mucus membranes moist. Dentition without lesions. Pharynx clear, uvula midline.     Neck: Supple, without meningismus. Thyroid not palpable. Trachea at midline. No lymphadenopathy.    Pulmonary: Clear bilaterally with good chest wall excursion. No rales, rhonchi or wheezing. No accessory muscle use or stridor.    Cardiac: Normal S1, S2 without murmur, rub, gallop or extrasystole. No JVD, Carotids without bruits.    Abdomen: Soft, nontender, active bowel sounds.  No palpable organomegaly.  No rebound or guarding.  No CVA tenderness.    Genitourinary: Exam deferred.    Musculoskeletal: Full range of motion. no pain, edema, or deformity. Pulses full and equal. No cyanosis, clubbing, or edema.    Neurological:  Cranial nerves II through XII are grossly intact, finger-nose touch is normal, normal sensation, no weakness, no focal findings identified.    Psychiatric: Appropriate mood and affect.     Medical Decision-Making:  Testing: EKG was obtained which is interpreted me showed a sinus rhythm rate of 60 without evidence of obvious ST elevations or T wave inversions indicate acute ischemia or infarct.  Ultrasound was obtained today which is showing signs of cholecystitis.  White count is 13 which is unchanged from previous.  He has normal electrolytes normal LFTs and a normal lipase.  I did speak to Dr. Johnson who evaluated the patient at the bedside.  At this time we will add him onto the OR schedule.  Patient was given IV Zosyn  Treatment:   Reevaluation:   Plan: To the OR patient and family/friend/caregiver are in agreement with this plan.   Impression:   1.  Cholecystitis  2.    Labs Reviewed  CBC WITH AUTO DIFFERENTIAL - Abnormal     WBC                           13.4 (*)               nRBC                           0.0                    RBC                           4.86                   Hemoglobin                    14.2                   Hematocrit                    44.2                   MCV                           91                     MCH                           29.2                   MCHC                          32.1                   RDW                           12.8                   Platelets                     434                    Neutrophils %                 84.9                   Immature Granulocytes %, Automated   2.1 (*)                Lymphocytes %                 8.0                    Monocytes %                   3.4                    Eosinophils %                 0.9                    Basophils %                   0.7                    Neutrophils Absolute          11.39 (*)               Immature Granulocytes Absolute, Au*   0.28                   Lymphocytes Absolute          1.08                   Monocytes Absolute            0.46                   Eosinophils Absolute          0.12                   Basophils Absolute            0.09                LIPASE - Normal     Lipase                        41                       Narrative: Venipuncture immediately after or during the administration of Metamizole may lead to falsely low results. Testing should be performed immediately prior to Metamizole dosing.  LACTATE - Normal     Lactate                       1.2                      Narrative: Venipuncture immediately after or during the administration of Metamizole may lead to falsely low results. Testing should be performed immediately prior to Metamizole dosing.  HEPATIC FUNCTION PANEL - Normal     Albumin                       3.9                    Bilirubin, Total              0.9                    Bilirubin, Direct             0.1                    Alkaline Phosphatase          107                    ALT                           29                     AST                            18                     Total Protein                 7.4                 MAGNESIUM - Normal     Magnesium                     2.22                BASIC METABOLIC PANEL - Normal     Glucose                       98                     Sodium                        136                    Potassium                     4.5                    Chloride                      103                    Bicarbonate                   23                     Anion Gap                     15                     Urea Nitrogen                 13                     Creatinine                    0.87                   eGFR                          >90                    Calcium                       9.2                 TISSUE/WOUND CULTURE/SMEAR  COMPREHENSIVE METABOLIC PANEL  CBC  SURGICAL PATHOLOGY EXAM     US gallbladder   Final Result    Sludge and stones within the gallbladder with a positive sonographic    Rees's sign and gallbladder wall thickening.  No biliary dilatation    is appreciated.    Signed by Vitor Powers MD     FL less than 1 hour    (Results Pending)                 Patient History   Medical History[1]  Surgical History[2]  Family History[3]  Social History[4]    Physical Exam   ED Triage Vitals [07/01/25 1040]   Temperature Heart Rate Respirations BP   36 °C (96.8 °F) 60 16 163/72      Pulse Ox Temp Source Heart Rate Source Patient Position   99 % Temporal Monitor Sitting      BP Location FiO2 (%)     Right arm --       Physical Exam      ED Course & MDM   Diagnoses as of 07/01/25 1912   RUQ pain   Cholecystitis   Chronic cholecystitis                 No data recorded     Hernan Coma Scale Score: 15 (07/01/25 1041 : Reina Monk RN)                           Medical Decision Making      Procedure  Procedures       [1]   Past Medical History:  Diagnosis Date    Hypertension    [2] History reviewed. No pertinent surgical history.  [3] No family history on file.  [4]   Social History  Tobacco Use     Smoking status: Former     Current packs/day: 0.00     Types: Cigarettes     Quit date: 1995     Years since quittin.0    Smokeless tobacco: Never   Vaping Use    Vaping status: Never Used   Substance Use Topics    Alcohol use: Not Currently    Drug use: Never        Mary Suazo MD  25 1912

## 2025-07-01 NOTE — HOSPITAL COURSE
Edwin Mayorga is a 74 y.o. male with PmHx of HTN, HCHOL, GERD, Prostate Cancer s/p radiation, and Fall (5 mos ago) sustained R heel fracture currently wearing R boot, who presented in the [facility] ED today 7/1 for complains of worsening RUQ abdominal pain with nausea. Admitted to general surgery service for laparoscopic cholecystectomy. Gallbladder US shows sludge and multiple gallstones with no obstructions.     Patient taken to OR with Dr. Johnson for laparoscopic cholecystectomy. During OR course, gallbladder was visualized and acute gangrenous cholecystitis, gallbladder empyema and pericholecystic abscess was found. [facility] upon conclusion of case.     BUFFY drain is removed 7/3, educated on showering, lifting restrictions, diet modifications. Will continue PO Abx as ordered.

## 2025-07-01 NOTE — ANESTHESIA PROCEDURE NOTES
Airway  Date/Time: 7/1/2025 3:58 PM  Reason: elective    Airway not difficult    Staffing  Performed: CRNA   Authorized by: Gen Howe MD    Performed by: JACOBY Armijo-MAYI  Patient location during procedure: OR    Patient Condition  Indications for airway management: anesthesia  Patient position: sniffing  MILS maintained throughout  Planned trial extubation  Sedation level: deep     Final Airway Details   Preoxygenated: yes  Final airway type: endotracheal airway  Successful airway: ETT  Cuffed: yes   Successful intubation technique: video laryngoscopy  Adjuncts used in placement: intubating stylet  Endotracheal tube insertion site: oral  Blade: Kade  Blade size: #4  ETT size (mm): 7.5  Cormack-Lehane Classification: grade IIa - partial view of glottis  Placement verified by: chest auscultation and capnometry   Cuff volume (mL): 10  Measured from: teeth  ETT to teeth (cm): 21  Ventilation between attempts: none  Number of attempts at approach: 1  Number of other approaches attempted: 0

## 2025-07-01 NOTE — ANESTHESIA PREPROCEDURE EVALUATION
Patient: Edwin Mayorga    Procedure Information       Date/Time: 07/01/25 1500    Procedure: LAPAROSCOPIC CHOLECYSTECTOMY WITH CHOLANGIOGRAM WITH C-ARM/ POSSIBLE OPEN    Location: [facility] OR 12 / [facility]    Surgeons: Brent Johnson MD            Relevant Problems   Anesthesia (within normal limits)      Cardiac   (+) Chest pain, unspecified   (+) HTN (hypertension)   (+) Hyperlipidemia, unspecified      Pulmonary   (+) Mild intermittent asthma, uncomplicated (HHS-HCC)      Neuro   (+) JESSIE (generalized anxiety disorder)      /Renal   (+) Malignant neoplasm of prostate (Multi)      Liver   (+) Calculus of gallbladder with chronic cholecystitis without obstruction       Clinical information reviewed:   Tobacco  Allergies  Meds  Problems  Med Hx  Surg Hx   Fam Hx  Soc   Hx        NPO Detail:  No data recorded     Physical Exam    Airway  Mallampati: II  TM distance: >3 FB  Neck ROM: full  Mouth opening: 3 or more finger widths     Cardiovascular - normal exam   Dental     (+) implants       Pulmonary - normal exam   Abdominal (+) obese             Anesthesia Plan    History of general anesthesia?: yes  History of complications of general anesthesia?: no    ASA 3 - emergent     general     The patient is not a current smoker.    intravenous induction   Postoperative pain plan includes opioids.  Trial extubation is planned.  Anesthetic plan and risks discussed with patient.  Use of blood products discussed with patient who consented to blood products.    Plan discussed with CRNA and CAA.

## 2025-07-01 NOTE — TELEPHONE ENCOUNTER
Spoke with patient's son about a cancellation on Dr. Johnson's surgical schedule for July 8 and if he'd like to move up to that date.  Son verbalized that his father is currently in the ER as the pain last night was so bad, but to please move the surgical case up to that date.  Patient's son stated he'd call back if anything changes.

## 2025-07-01 NOTE — H&P
History Of Present Illness  Edwin Mayorga is a 74 y.o. male with PmHx of HTN, HCHOL, GERD, Prostate Cancer s/p radiation, and Fall (5 mos ago) sustained R heel fracture currently wearing R boot, who presented in the [facility] ED today 7/1 for complains RUQ abdominal pain with nausea. States that he was in the ED last week for similar symptoms, was discharge home, and was referred to general surgery for outpatient F/U. States that last night RUQ pain was getting worse and was unable to sleep. Endorses loss of appetite for over week. Describes epigastric pain as dull pain, and RUQ pain as sharp pain, rates pain 6/10. States that he took ibuprofen for pain with minimal relief. Denies any shortness of breath, or chest pain. Denies any fever, or chills.      Past Medical History  Medical History[1]    Surgical History  Surgical History[2]     Social History  He reports that he quit smoking about 30 years ago. His smoking use included cigarettes. He has never used smokeless tobacco. He reports that he does not currently use alcohol. He reports that he does not use drugs.    Family History  Family History[3]     Allergies  Patient has no known allergies.    Review of Systems   Constitutional:  Positive for appetite change and unexpected weight change. Negative for activity change, chills and fever.   Respiratory:  Negative for cough, chest tightness and shortness of breath.    Cardiovascular:  Negative for chest pain.   Gastrointestinal:  Positive for abdominal pain and nausea. Negative for abdominal distention and vomiting.   Genitourinary:  Negative for difficulty urinating.        Physical Exam  Constitutional:       General: He is awake.      Appearance: Normal appearance.   Cardiovascular:      Rate and Rhythm: Normal rate and regular rhythm.      Heart sounds: Normal heart sounds, S1 normal and S2 normal.   Pulmonary:      Effort: Pulmonary effort is normal. No respiratory distress.      Breath sounds: Normal  "breath sounds. No stridor.   Abdominal:      General: Bowel sounds are normal. There is no distension.      Tenderness: There is abdominal tenderness in the right upper quadrant. There is guarding. Positive signs include Rees's sign.   Skin:     General: Skin is warm.      Capillary Refill: Capillary refill takes less than 2 seconds.   Neurological:      Mental Status: He is alert.   Psychiatric:         Attention and Perception: Attention normal.         Mood and Affect: Mood normal.         Speech: Speech normal.         Behavior: Behavior is cooperative.          Last Recorded Vitals  Blood pressure 163/72, pulse 60, temperature 36 °C (96.8 °F), temperature source Temporal, resp. rate 16, height 1.702 m (5' 7\"), weight 90.7 kg (200 lb), SpO2 99%.    Relevant Results  Results for orders placed or performed during the hospital encounter of 07/01/25 (from the past 24 hours)   Lipase   Result Value Ref Range    Lipase 41 9 - 82 U/L   Lactate   Result Value Ref Range    Lactate 1.2 0.4 - 2.0 mmol/L   Hepatic Function Panel   Result Value Ref Range    Albumin 3.9 3.4 - 5.0 g/dL    Bilirubin, Total 0.9 0.0 - 1.2 mg/dL    Bilirubin, Direct 0.1 0.0 - 0.3 mg/dL    Alkaline Phosphatase 107 33 - 136 U/L    ALT 29 10 - 52 U/L    AST 18 9 - 39 U/L    Total Protein 7.4 6.4 - 8.2 g/dL   Magnesium   Result Value Ref Range    Magnesium 2.22 1.60 - 2.40 mg/dL   Basic Metabolic Panel   Result Value Ref Range    Glucose 98 74 - 99 mg/dL    Sodium 136 136 - 145 mmol/L    Potassium 4.5 3.5 - 5.3 mmol/L    Chloride 103 98 - 107 mmol/L    Bicarbonate 23 21 - 32 mmol/L    Anion Gap 15 10 - 20 mmol/L    Urea Nitrogen 13 6 - 23 mg/dL    Creatinine 0.87 0.50 - 1.30 mg/dL    eGFR >90 >60 mL/min/1.73m*2    Calcium 9.2 8.6 - 10.3 mg/dL   CBC and Auto Differential   Result Value Ref Range    WBC 13.4 (H) 4.4 - 11.3 x10*3/uL    nRBC 0.0 0.0 - 0.0 /100 WBCs    RBC 4.86 4.50 - 5.90 x10*6/uL    Hemoglobin 14.2 13.5 - 17.5 g/dL    Hematocrit 44.2 " 41.0 - 52.0 %    MCV 91 80 - 100 fL    MCH 29.2 26.0 - 34.0 pg    MCHC 32.1 32.0 - 36.0 g/dL    RDW 12.8 11.5 - 14.5 %    Platelets 434 150 - 450 x10*3/uL    Neutrophils % 84.9 40.0 - 80.0 %    Immature Granulocytes %, Automated 2.1 (H) 0.0 - 0.9 %    Lymphocytes % 8.0 13.0 - 44.0 %    Monocytes % 3.4 2.0 - 10.0 %    Eosinophils % 0.9 0.0 - 6.0 %    Basophils % 0.7 0.0 - 2.0 %    Neutrophils Absolute 11.39 (H) 1.60 - 5.50 x10*3/uL    Immature Granulocytes Absolute, Automated 0.28 0.00 - 0.50 x10*3/uL    Lymphocytes Absolute 1.08 0.80 - 3.00 x10*3/uL    Monocytes Absolute 0.46 0.05 - 0.80 x10*3/uL    Eosinophils Absolute 0.12 0.00 - 0.40 x10*3/uL    Basophils Absolute 0.09 0.00 - 0.10 x10*3/uL          Assessment & Plan  Calculus of gallbladder with chronic cholecystitis without obstruction    Edwin Mayorga is a 74 y.o. male with PmHx of HTN, HCHOL, GERD, Prostate Cancer s/p radiation, and Fall (5 mos ago) sustained R heel fracture currently wearing R boot, who presented in the [facility] ED today 7/1 for complains of worsening RUQ abdominal pain with nausea. Admitted to general surgery service for laparoscopic cholecystectomy. Gallbladder US shows sludge and multiple gallstones with no obstructions.     Assessment and Tx Plan  #cholelithiasis   #cholecystitis    > + singh's sign  #leukocytosis   - NPO for procedure  - Laparoscopic Cholecystectomy with Dr. Johnson today  - IV fluids   - IV ABX   - Pain management   - Antiemetic     Dispo: scheduled surgery this afternoon. May be discharge home if no infection.     Patient was seen and discussed with Dr. Johnson today in the ED.     Maryam Morrell, RN  NP Student          [1]   Past Medical History:  Diagnosis Date    Hypertension    [2] History reviewed. No pertinent surgical history.  [3] No family history on file.

## 2025-07-01 NOTE — ANESTHESIA POSTPROCEDURE EVALUATION
Patient: Edwin Mayorga    Procedure Summary       Date: 07/01/25 Room / Location: [facility] OR 12 / [facility]    Anesthesia Start: 1550 Anesthesia Stop:     Procedure: LAPAROSCOPIC CHOLECYSTECTOMY WITH CHOLANGIOGRAM WITH C-ARM (Abdomen) Diagnosis:       Calculus of gallbladder with chronic cholecystitis without obstruction      (Calculus of gallbladder with chronic cholecystitis without obstruction [K80.10])    Surgeons: Brent Johnson MD Responsible Provider: Gen Howe MD    Anesthesia Type: general ASA Status: 3 - Emergent            Anesthesia Type: general    Vitals Value Taken Time   /72 07/01/25 18:30   Temp 36.2 07/01/25 18:30   Pulse 61 07/01/25 18:30   Resp 16 07/01/25 18:30   SpO2 100 07/01/25 18:30       Anesthesia Post Evaluation    Patient location during evaluation: PACU  Patient participation: complete - patient participated  Level of consciousness: awake  Pain score: 0  Pain management: adequate  Airway patency: patent  Cardiovascular status: acceptable and hemodynamically stable  Respiratory status: acceptable, face mask, nonlabored ventilation and spontaneous ventilation  Hydration status: acceptable  Postoperative Nausea and Vomiting: none        There were no known notable events for this encounter.

## 2025-07-01 NOTE — ED TRIAGE NOTES
PT. C/O UPPER ABD. PAIN. PT. STATES WAS SEEN FOR SAME LAST WEEK AND WAS TOLD HAD GALLBLADDER PROBLEM. PT. STATES PAIN GOT BETTER AND THEN STARTED TO GET WORSE AGAIN LAST NIGHT. +NAUSEA. LAST BM YESTERDAY. DENIES FEVERS.

## 2025-07-01 NOTE — INTERVAL H&P NOTE
H&P reviewed. The patient went to the emergency room today with worsened right upper quadrant abdominal pain.  Found to have leukocytosis and ultrasound consistent with acute cholecystitis.  Plan to move his laparoscopic cholecystectomy to today.

## 2025-07-02 LAB
ALBUMIN SERPL BCP-MCNC: 3.3 G/DL (ref 3.4–5)
ALP SERPL-CCNC: 94 U/L (ref 33–136)
ALT SERPL W P-5'-P-CCNC: 22 U/L (ref 10–52)
ANION GAP SERPL CALC-SCNC: 15 MMOL/L (ref 10–20)
AST SERPL W P-5'-P-CCNC: 12 U/L (ref 9–39)
ATRIAL RATE: 60 BPM
BILIRUB SERPL-MCNC: 0.8 MG/DL (ref 0–1.2)
BUN SERPL-MCNC: 15 MG/DL (ref 6–23)
CALCIUM SERPL-MCNC: 8.5 MG/DL (ref 8.6–10.3)
CHLORIDE SERPL-SCNC: 104 MMOL/L (ref 98–107)
CO2 SERPL-SCNC: 24 MMOL/L (ref 21–32)
CREAT SERPL-MCNC: 1.1 MG/DL (ref 0.5–1.3)
EGFRCR SERPLBLD CKD-EPI 2021: 70 ML/MIN/1.73M*2
ERYTHROCYTE [DISTWIDTH] IN BLOOD BY AUTOMATED COUNT: 12.6 % (ref 11.5–14.5)
GLUCOSE SERPL-MCNC: 120 MG/DL (ref 74–99)
HCT VFR BLD AUTO: 40.8 % (ref 41–52)
HGB BLD-MCNC: 13.1 G/DL (ref 13.5–17.5)
MCH RBC QN AUTO: 29 PG (ref 26–34)
MCHC RBC AUTO-ENTMCNC: 32.1 G/DL (ref 32–36)
MCV RBC AUTO: 91 FL (ref 80–100)
NRBC BLD-RTO: 0 /100 WBCS (ref 0–0)
P AXIS: 105 DEGREES
P OFFSET: 176 MS
P ONSET: 147 MS
PLATELET # BLD AUTO: 478 X10*3/UL (ref 150–450)
POTASSIUM SERPL-SCNC: 4.5 MMOL/L (ref 3.5–5.3)
PR INTERVAL: 148 MS
PROT SERPL-MCNC: 6 G/DL (ref 6.4–8.2)
Q ONSET: 221 MS
QRS COUNT: 10 BEATS
QRS DURATION: 72 MS
QT INTERVAL: 422 MS
QTC CALCULATION(BAZETT): 422 MS
QTC FREDERICIA: 422 MS
R AXIS: 33 DEGREES
RBC # BLD AUTO: 4.51 X10*6/UL (ref 4.5–5.9)
SODIUM SERPL-SCNC: 138 MMOL/L (ref 136–145)
T AXIS: 47 DEGREES
T OFFSET: 432 MS
VENTRICULAR RATE: 60 BPM
WBC # BLD AUTO: 17 X10*3/UL (ref 4.4–11.3)

## 2025-07-02 PROCEDURE — 84075 ASSAY ALKALINE PHOSPHATASE: CPT | Performed by: REGISTERED NURSE

## 2025-07-02 PROCEDURE — 2500000004 HC RX 250 GENERAL PHARMACY W/ HCPCS (ALT 636 FOR OP/ED): Performed by: REGISTERED NURSE

## 2025-07-02 PROCEDURE — 2500000001 HC RX 250 WO HCPCS SELF ADMINISTERED DRUGS (ALT 637 FOR MEDICARE OP): Performed by: REGISTERED NURSE

## 2025-07-02 PROCEDURE — 36415 COLL VENOUS BLD VENIPUNCTURE: CPT | Performed by: REGISTERED NURSE

## 2025-07-02 PROCEDURE — 1210000001 HC SEMI-PRIVATE ROOM DAILY

## 2025-07-02 PROCEDURE — 85027 COMPLETE CBC AUTOMATED: CPT | Performed by: REGISTERED NURSE

## 2025-07-02 RX ORDER — KETOROLAC TROMETHAMINE 30 MG/ML
15 INJECTION, SOLUTION INTRAMUSCULAR; INTRAVENOUS 3 TIMES DAILY
Status: DISCONTINUED | OUTPATIENT
Start: 2025-07-02 | End: 2025-07-03 | Stop reason: HOSPADM

## 2025-07-02 RX ADMIN — ACETAMINOPHEN 650 MG: 325 TABLET ORAL at 01:32

## 2025-07-02 RX ADMIN — GABAPENTIN 300 MG: 300 CAPSULE ORAL at 21:08

## 2025-07-02 RX ADMIN — KETOROLAC TROMETHAMINE 15 MG: 30 INJECTION, SOLUTION INTRAMUSCULAR; INTRAVENOUS at 21:08

## 2025-07-02 RX ADMIN — HEPARIN SODIUM 5000 UNITS: 5000 INJECTION, SOLUTION INTRAVENOUS; SUBCUTANEOUS at 11:33

## 2025-07-02 RX ADMIN — PIPERACILLIN SODIUM AND TAZOBACTAM SODIUM 3.38 G: 3; .375 INJECTION, SOLUTION INTRAVENOUS at 14:53

## 2025-07-02 RX ADMIN — PIPERACILLIN SODIUM AND TAZOBACTAM SODIUM 3.38 G: 3; .375 INJECTION, SOLUTION INTRAVENOUS at 08:44

## 2025-07-02 RX ADMIN — AMLODIPINE BESYLATE 10 MG: 10 TABLET ORAL at 08:45

## 2025-07-02 RX ADMIN — ATORVASTATIN CALCIUM 20 MG: 20 TABLET, FILM COATED ORAL at 08:45

## 2025-07-02 RX ADMIN — ACETAMINOPHEN 650 MG: 325 TABLET ORAL at 08:44

## 2025-07-02 RX ADMIN — HEPARIN SODIUM 5000 UNITS: 5000 INJECTION, SOLUTION INTRAVENOUS; SUBCUTANEOUS at 06:00

## 2025-07-02 RX ADMIN — PANTOPRAZOLE SODIUM 40 MG: 40 TABLET, DELAYED RELEASE ORAL at 06:00

## 2025-07-02 RX ADMIN — PIPERACILLIN SODIUM AND TAZOBACTAM SODIUM 3.38 G: 3; .375 INJECTION, SOLUTION INTRAVENOUS at 21:09

## 2025-07-02 RX ADMIN — KETOROLAC TROMETHAMINE 15 MG: 30 INJECTION, SOLUTION INTRAMUSCULAR; INTRAVENOUS at 10:51

## 2025-07-02 RX ADMIN — ACETAMINOPHEN 650 MG: 325 TABLET ORAL at 14:52

## 2025-07-02 RX ADMIN — PIPERACILLIN SODIUM AND TAZOBACTAM SODIUM 3.38 G: 3; .375 INJECTION, SOLUTION INTRAVENOUS at 01:31

## 2025-07-02 RX ADMIN — ACETAMINOPHEN 650 MG: 325 TABLET ORAL at 21:08

## 2025-07-02 RX ADMIN — HEPARIN SODIUM 5000 UNITS: 5000 INJECTION, SOLUTION INTRAVENOUS; SUBCUTANEOUS at 21:08

## 2025-07-02 RX ADMIN — HYDROCHLOROTHIAZIDE 25 MG: 25 TABLET ORAL at 08:44

## 2025-07-02 RX ADMIN — KETOROLAC TROMETHAMINE 15 MG: 30 INJECTION, SOLUTION INTRAMUSCULAR; INTRAVENOUS at 15:26

## 2025-07-02 RX ADMIN — GABAPENTIN 300 MG: 300 CAPSULE ORAL at 15:26

## 2025-07-02 ASSESSMENT — COGNITIVE AND FUNCTIONAL STATUS - GENERAL
CLIMB 3 TO 5 STEPS WITH RAILING: A LITTLE
HELP NEEDED FOR BATHING: A LITTLE
PERSONAL GROOMING: A LITTLE
TOILETING: A LITTLE
DAILY ACTIVITIY SCORE: 19
STANDING UP FROM CHAIR USING ARMS: A LITTLE
MOBILITY SCORE: 19
MOVING TO AND FROM BED TO CHAIR: A LITTLE
TURNING FROM BACK TO SIDE WHILE IN FLAT BAD: A LITTLE
WALKING IN HOSPITAL ROOM: A LITTLE
DRESSING REGULAR LOWER BODY CLOTHING: A LITTLE
DRESSING REGULAR UPPER BODY CLOTHING: A LITTLE

## 2025-07-02 ASSESSMENT — PAIN SCALES - GENERAL
PAINLEVEL_OUTOF10: 0 - NO PAIN
PAINLEVEL_OUTOF10: 0 - NO PAIN

## 2025-07-02 ASSESSMENT — PAIN - FUNCTIONAL ASSESSMENT: PAIN_FUNCTIONAL_ASSESSMENT: 0-10

## 2025-07-02 NOTE — PROGRESS NOTES
"Edwin Mayorga is a 74 y.o. male on day 1 of admission presenting with Calculus of gallbladder with chronic cholecystitis without obstruction.    Subjective   Endorses pain in RUQ and discomfort from where the BUFFY drain is coming. HDS, discussed elevated WBC and continuing IV abx. Encourage use of IS and ambulation. Was able to transfer from bed to chair with minimal assist.        Objective     Physical Exam  Constitutional:       General: He is awake.      Appearance: Normal appearance.   Cardiovascular:      Rate and Rhythm: Normal rate and regular rhythm.      Pulses: Normal pulses.           Radial pulses are 2+ on the right side and 2+ on the left side.        Posterior tibial pulses are 2+ on the right side and 2+ on the left side.      Heart sounds: Normal heart sounds.   Pulmonary:      Effort: Pulmonary effort is normal. No respiratory distress.      Breath sounds: Normal breath sounds.   Abdominal:      General: Bowel sounds are normal.      Palpations: Abdomen is soft.      Tenderness: There is no guarding.      Comments: R BUFFY drain serosanguineous output. Laparoscopic sites, clean, dry and no hematoma.     Skin:     General: Skin is warm.      Capillary Refill: Capillary refill takes less than 2 seconds.      Coloration: Skin is not jaundiced or pale.   Neurological:      Mental Status: He is alert.   Psychiatric:         Attention and Perception: Attention normal.         Mood and Affect: Mood normal.         Speech: Speech normal.         Behavior: Behavior is cooperative.         Last Recorded Vitals  Blood pressure 155/65, pulse 60, temperature 37 °C (98.6 °F), temperature source Temporal, resp. rate 18, height 1.702 m (5' 7\"), weight 90.7 kg (200 lb), SpO2 95%.  Intake/Output last 3 Shifts:  I/O last 3 completed shifts:  In: 1000 (11 mL/kg) [IV Piggyback:1000]  Out: 1015 (11.2 mL/kg) [Urine:900 (0.3 mL/kg/hr); Drains:85; Blood:30]  Weight: 90.7 kg     Relevant Results  Results for orders placed " or performed during the hospital encounter of 07/01/25 (from the past 24 hours)   ECG 12 lead   Result Value Ref Range    Ventricular Rate 60 BPM    Atrial Rate 60 BPM    NH Interval 148 ms    QRS Duration 72 ms    QT Interval 422 ms    QTC Calculation(Bazett) 422 ms    P Axis 105 degrees    R Axis 33 degrees    T Axis 47 degrees    QRS Count 10 beats    Q Onset 221 ms    P Onset 147 ms    P Offset 176 ms    T Offset 432 ms    QTC Fredericia 422 ms   Lipase   Result Value Ref Range    Lipase 41 9 - 82 U/L   Lactate   Result Value Ref Range    Lactate 1.2 0.4 - 2.0 mmol/L   Hepatic Function Panel   Result Value Ref Range    Albumin 3.9 3.4 - 5.0 g/dL    Bilirubin, Total 0.9 0.0 - 1.2 mg/dL    Bilirubin, Direct 0.1 0.0 - 0.3 mg/dL    Alkaline Phosphatase 107 33 - 136 U/L    ALT 29 10 - 52 U/L    AST 18 9 - 39 U/L    Total Protein 7.4 6.4 - 8.2 g/dL   Magnesium   Result Value Ref Range    Magnesium 2.22 1.60 - 2.40 mg/dL   Basic Metabolic Panel   Result Value Ref Range    Glucose 98 74 - 99 mg/dL    Sodium 136 136 - 145 mmol/L    Potassium 4.5 3.5 - 5.3 mmol/L    Chloride 103 98 - 107 mmol/L    Bicarbonate 23 21 - 32 mmol/L    Anion Gap 15 10 - 20 mmol/L    Urea Nitrogen 13 6 - 23 mg/dL    Creatinine 0.87 0.50 - 1.30 mg/dL    eGFR >90 >60 mL/min/1.73m*2    Calcium 9.2 8.6 - 10.3 mg/dL   CBC and Auto Differential   Result Value Ref Range    WBC 13.4 (H) 4.4 - 11.3 x10*3/uL    nRBC 0.0 0.0 - 0.0 /100 WBCs    RBC 4.86 4.50 - 5.90 x10*6/uL    Hemoglobin 14.2 13.5 - 17.5 g/dL    Hematocrit 44.2 41.0 - 52.0 %    MCV 91 80 - 100 fL    MCH 29.2 26.0 - 34.0 pg    MCHC 32.1 32.0 - 36.0 g/dL    RDW 12.8 11.5 - 14.5 %    Platelets 434 150 - 450 x10*3/uL    Neutrophils % 84.9 40.0 - 80.0 %    Immature Granulocytes %, Automated 2.1 (H) 0.0 - 0.9 %    Lymphocytes % 8.0 13.0 - 44.0 %    Monocytes % 3.4 2.0 - 10.0 %    Eosinophils % 0.9 0.0 - 6.0 %    Basophils % 0.7 0.0 - 2.0 %    Neutrophils Absolute 11.39 (H) 1.60 - 5.50 x10*3/uL     Immature Granulocytes Absolute, Automated 0.28 0.00 - 0.50 x10*3/uL    Lymphocytes Absolute 1.08 0.80 - 3.00 x10*3/uL    Monocytes Absolute 0.46 0.05 - 0.80 x10*3/uL    Eosinophils Absolute 0.12 0.00 - 0.40 x10*3/uL    Basophils Absolute 0.09 0.00 - 0.10 x10*3/uL   Tissue/Wound Culture/Smear    Specimen: GALLBLADDER CHOLECYSTECTOMY; Swab   Result Value Ref Range    Tissue/Wound Culture/Smear No growth to date    Comprehensive Metabolic Panel   Result Value Ref Range    Glucose 120 (H) 74 - 99 mg/dL    Sodium 138 136 - 145 mmol/L    Potassium 4.5 3.5 - 5.3 mmol/L    Chloride 104 98 - 107 mmol/L    Bicarbonate 24 21 - 32 mmol/L    Anion Gap 15 10 - 20 mmol/L    Urea Nitrogen 15 6 - 23 mg/dL    Creatinine 1.10 0.50 - 1.30 mg/dL    eGFR 70 >60 mL/min/1.73m*2    Calcium 8.5 (L) 8.6 - 10.3 mg/dL    Albumin 3.3 (L) 3.4 - 5.0 g/dL    Alkaline Phosphatase 94 33 - 136 U/L    Total Protein 6.0 (L) 6.4 - 8.2 g/dL    AST 12 9 - 39 U/L    Bilirubin, Total 0.8 0.0 - 1.2 mg/dL    ALT 22 10 - 52 U/L   CBC   Result Value Ref Range    WBC 17.0 (H) 4.4 - 11.3 x10*3/uL    nRBC 0.0 0.0 - 0.0 /100 WBCs    RBC 4.51 4.50 - 5.90 x10*6/uL    Hemoglobin 13.1 (L) 13.5 - 17.5 g/dL    Hematocrit 40.8 (L) 41.0 - 52.0 %    MCV 91 80 - 100 fL    MCH 29.0 26.0 - 34.0 pg    MCHC 32.1 32.0 - 36.0 g/dL    RDW 12.6 11.5 - 14.5 %    Platelets 478 (H) 150 - 450 x10*3/uL                Assessment & Plan  Calculus of gallbladder with chronic cholecystitis without obstruction    Chronic cholecystitis    Edwin Mayorga is a 74 y.o. male with PmHx of HTN, HCHOL, GERD, Prostate Cancer s/p radiation, and Fall (5 mos ago) sustained R heel fracture currently wearing R boot, who presented in the [facility] ED today 7/1 for complains of worsening RUQ abdominal pain with nausea. Admitted to general surgery service for laparoscopic cholecystectomy. Gallbladder US shows sludge and multiple gallstones with no obstructions.     Procedure:  Laparoscopic  Cholecystectomy  7/1     Assessment and Tx Plan  #cholelithiasis   #cholecystitis    > + singh's sign  #gangrenous gallbladder with empyema   #leukocytosis   - Continue IV ABX   -Advance diet to Lowfat Diet  - Multimodal pain medication    > Toradol IV 15 mg Q 6H   - Antiemetic   - BUFFY drain mgt - record and drain output   - Encourage use of IS and ambulation  - Repeat CBC in AM    PPX: SCDs     Dispo: Not medically ready for discharge. Will recheck CBC tomorrow.      Patient was discussed with on call attending, Dr. Rdz, today.      Maryam Morrell, RN  NP Student        Maryam Morrell, RN

## 2025-07-02 NOTE — PROGRESS NOTES
07/02/25 1231   Discharge Planning   Living Arrangements Spouse/significant other   Support Systems Spouse/significant other;Children   Type of Residence Private residence   Do you have animals or pets at home? No   Who is requesting discharge planning? Provider   Expected Discharge Disposition Home   Stroke Family Assessment   Stroke Family Assessment Needed No   Intensity of Service   Intensity of Service 0-30 min     Met with pt and his family, admit for RUQ pain, pt is post op Lap Candice.  Continue to tx pt post operatively.  Pt is independent , denies any needs.  Home address, insurance and PCP confirmed.  Plan is for home at discharge.   Ivana Rascon RN [facility]

## 2025-07-02 NOTE — TREATMENT PLAN
Chart reviewed where relevant. Discussed findings and clinical plan with note author. Full note to follow.    Cholelithiasis with acute gangrenous cholecystitis, gallbladder empyema and pericholecystic abscess during lap cynthia 07/01/2025 with Dr. Johnson. Normal cholanigogram.  Drain is serosanguinous, 85 ml last 24 hours. Complaining of pain at BUFFY site.  WBC 17 from 13.4.  Adding toradol for pain management.  Continue antibiotic course for four days minimum.  Labs in AM.  PT / OT.    You Rdz MD, PhD  Available via [employer]

## 2025-07-03 ENCOUNTER — PHARMACY VISIT (OUTPATIENT)
Dept: PHARMACY | Facility: CLINIC | Age: 75
End: 2025-07-03
Payer: MEDICARE

## 2025-07-03 VITALS
RESPIRATION RATE: 18 BRPM | DIASTOLIC BLOOD PRESSURE: 65 MMHG | HEIGHT: 67 IN | OXYGEN SATURATION: 95 % | TEMPERATURE: 98.6 F | BODY MASS INDEX: 31.39 KG/M2 | SYSTOLIC BLOOD PRESSURE: 155 MMHG | WEIGHT: 200 LBS | HEART RATE: 60 BPM

## 2025-07-03 VITALS
DIASTOLIC BLOOD PRESSURE: 58 MMHG | TEMPERATURE: 96.8 F | RESPIRATION RATE: 16 BRPM | SYSTOLIC BLOOD PRESSURE: 117 MMHG | OXYGEN SATURATION: 96 % | HEART RATE: 57 BPM

## 2025-07-03 PROBLEM — K81.1 CHRONIC CHOLECYSTITIS: Status: RESOLVED | Noted: 2025-07-01 | Resolved: 2025-07-03

## 2025-07-03 PROBLEM — K80.10 CALCULUS OF GALLBLADDER WITH CHRONIC CHOLECYSTITIS WITHOUT OBSTRUCTION: Status: RESOLVED | Noted: 2025-06-25 | Resolved: 2025-07-03

## 2025-07-03 LAB
ERYTHROCYTE [DISTWIDTH] IN BLOOD BY AUTOMATED COUNT: 13.2 % (ref 11.5–14.5)
HCT VFR BLD AUTO: 34 % (ref 41–52)
HGB BLD-MCNC: 11.2 G/DL (ref 13.5–17.5)
HOLD SPECIMEN: NORMAL
MCH RBC QN AUTO: 30.1 PG (ref 26–34)
MCHC RBC AUTO-ENTMCNC: 32.9 G/DL (ref 32–36)
MCV RBC AUTO: 91 FL (ref 80–100)
NRBC BLD-RTO: 0 /100 WBCS (ref 0–0)
PLATELET # BLD AUTO: 391 X10*3/UL (ref 150–450)
RBC # BLD AUTO: 3.72 X10*6/UL (ref 4.5–5.9)
WBC # BLD AUTO: 8 X10*3/UL (ref 4.4–11.3)

## 2025-07-03 PROCEDURE — 85027 COMPLETE CBC AUTOMATED: CPT | Performed by: REGISTERED NURSE

## 2025-07-03 PROCEDURE — 2500000004 HC RX 250 GENERAL PHARMACY W/ HCPCS (ALT 636 FOR OP/ED): Performed by: REGISTERED NURSE

## 2025-07-03 PROCEDURE — 99238 HOSP IP/OBS DSCHRG MGMT 30/<: CPT | Performed by: REGISTERED NURSE

## 2025-07-03 PROCEDURE — 36415 COLL VENOUS BLD VENIPUNCTURE: CPT | Performed by: REGISTERED NURSE

## 2025-07-03 PROCEDURE — 2500000001 HC RX 250 WO HCPCS SELF ADMINISTERED DRUGS (ALT 637 FOR MEDICARE OP): Performed by: REGISTERED NURSE

## 2025-07-03 PROCEDURE — RXMED WILLOW AMBULATORY MEDICATION CHARGE

## 2025-07-03 RX ORDER — ACETAMINOPHEN 325 MG/1
650 TABLET ORAL EVERY 6 HOURS PRN
Start: 2025-07-03

## 2025-07-03 RX ORDER — LEVOFLOXACIN 500 MG/1
500 TABLET, FILM COATED ORAL DAILY
Qty: 5 TABLET | Refills: 0 | Status: SHIPPED | OUTPATIENT
Start: 2025-07-03 | End: 2025-07-08

## 2025-07-03 RX ORDER — POLYETHYLENE GLYCOL 3350 17 G/17G
17 POWDER, FOR SOLUTION ORAL DAILY
Status: DISCONTINUED | OUTPATIENT
Start: 2025-07-03 | End: 2025-07-03 | Stop reason: HOSPADM

## 2025-07-03 RX ADMIN — GABAPENTIN 300 MG: 300 CAPSULE ORAL at 09:46

## 2025-07-03 RX ADMIN — PIPERACILLIN SODIUM AND TAZOBACTAM SODIUM 3.38 G: 3; .375 INJECTION, SOLUTION INTRAVENOUS at 02:26

## 2025-07-03 RX ADMIN — PANTOPRAZOLE SODIUM 40 MG: 40 TABLET, DELAYED RELEASE ORAL at 06:08

## 2025-07-03 RX ADMIN — KETOROLAC TROMETHAMINE 15 MG: 30 INJECTION, SOLUTION INTRAMUSCULAR; INTRAVENOUS at 09:45

## 2025-07-03 RX ADMIN — ACETAMINOPHEN 650 MG: 325 TABLET ORAL at 09:46

## 2025-07-03 RX ADMIN — HYDROCHLOROTHIAZIDE 25 MG: 25 TABLET ORAL at 09:46

## 2025-07-03 RX ADMIN — AMLODIPINE BESYLATE 10 MG: 10 TABLET ORAL at 09:46

## 2025-07-03 RX ADMIN — ACETAMINOPHEN 650 MG: 325 TABLET ORAL at 02:26

## 2025-07-03 RX ADMIN — PIPERACILLIN SODIUM AND TAZOBACTAM SODIUM 3.38 G: 3; .375 INJECTION, SOLUTION INTRAVENOUS at 09:46

## 2025-07-03 RX ADMIN — HEPARIN SODIUM 5000 UNITS: 5000 INJECTION, SOLUTION INTRAVENOUS; SUBCUTANEOUS at 05:12

## 2025-07-03 RX ADMIN — ATORVASTATIN CALCIUM 20 MG: 20 TABLET, FILM COATED ORAL at 09:46

## 2025-07-03 RX ADMIN — POLYETHYLENE GLYCOL 3350 17 G: 17 POWDER, FOR SOLUTION ORAL at 09:45

## 2025-07-03 ASSESSMENT — PAIN SCALES - GENERAL: PAINLEVEL_OUTOF10: 0 - NO PAIN

## 2025-07-03 NOTE — DISCHARGE SUMMARY
Discharge Diagnosis  Calculus of gallbladder with chronic cholecystitis without obstruction    Issues Requiring Follow-Up      Test Results Pending At Discharge  Pending Labs       Order Current Status    Surgical Pathology Exam In process    Tissue/Wound Culture/Smear Preliminary result            Hospital Course  Edwin Mayorga is a 74 y.o. male with PmHx of HTN, HCHOL, GERD, Prostate Cancer s/p radiation, and Fall (5 mos ago) sustained R heel fracture currently wearing R boot, who presented in the [facility] ED today 7/1 for complains of worsening RUQ abdominal pain with nausea. Admitted to general surgery service for laparoscopic cholecystectomy. Gallbladder US shows sludge and multiple gallstones with no obstructions.     Patient taken to OR with Dr. Johnson for laparoscopic cholecystectomy. During OR course, gallbladder was visualized and acute gangrenous cholecystitis, gallbladder empyema and pericholecystic abscess was found. Patient was transferred to [facility] upon conclusion of case.     BUFFY drain is removed 7/3, educated on showering, lifting restrictions, diet modifications. Will continue PO Abx as ordered.     Follow up with Dr. Johnson within 2 weeks.     Visit Vitals  /65 (BP Location: Right arm, Patient Position: Lying)   Pulse 60   Temp 37 °C (98.6 °F) (Temporal)   Resp 18     Vitals:    07/01/25 1508   Weight: 90.7 kg (200 lb)         There is no immunization history on file for this patient.    Results  Results for orders placed or performed during the hospital encounter of 07/01/25 (from the past 24 hours)   PST Top   Result Value Ref Range    Extra Tube Hold for add-ons.    CBC   Result Value Ref Range    WBC 8.0 4.4 - 11.3 x10*3/uL    nRBC 0.0 0.0 - 0.0 /100 WBCs    RBC 3.72 (L) 4.50 - 5.90 x10*6/uL    Hemoglobin 11.2 (L) 13.5 - 17.5 g/dL    Hematocrit 34.0 (L) 41.0 - 52.0 %    MCV 91 80 - 100 fL    MCH 30.1 26.0 - 34.0 pg    MCHC 32.9 32.0 - 36.0 g/dL    RDW 13.2 11.5 - 14.5 %    Platelets  391 150 - 450 x10*3/uL           Pertinent Physical Exam At Time of Discharge  Physical Exam  Constitutional:       General: He is awake.      Appearance: Normal appearance.   Cardiovascular:      Rate and Rhythm: Normal rate and regular rhythm.      Pulses: Normal pulses.   Pulmonary:      Effort: No respiratory distress.      Breath sounds: Normal breath sounds.   Abdominal:      General: Bowel sounds are normal. There is no distension.      Palpations: Abdomen is soft.      Tenderness: There is no abdominal tenderness.      Comments: BUFFY drain, serosanguineous minimal output. Removed, no bleeding or hematoma noted.    Skin:     General: Skin is warm.      Capillary Refill: Capillary refill takes less than 2 seconds.      Coloration: Skin is not ashen, jaundiced or pale.   Neurological:      Mental Status: He is alert.   Psychiatric:         Attention and Perception: Attention normal.         Mood and Affect: Mood normal.         Speech: Speech normal.         Behavior: Behavior is cooperative.         Home Medications     Medication List      START taking these medications     acetaminophen 325 mg tablet; Commonly known as: Tylenol; Take 2 tablets   (650 mg) by mouth every 6 hours if needed for mild pain (1 - 3) or   moderate pain (4 - 6).   levoFLOXacin 500 mg tablet; Commonly known as: Levaquin; Take 1 tablet   (500 mg) by mouth once daily for 5 days.     CONTINUE taking these medications     albuterol 90 mcg/actuation inhaler   amLODIPine 10 mg tablet; Commonly known as: Norvasc   atorvastatin 20 mg tablet; Commonly known as: Lipitor   hydroCHLOROthiazide 25 mg tablet; Commonly known as: HYDRODiuril   omeprazole 40 mg DR capsule; Commonly known as: PriLOSEC       Outpatient Follow-Up  No future appointments.    Maryam Morrell RN  NP Student

## 2025-07-03 NOTE — CARE PLAN
The patient's goals for the shift include  sleep    The clinical goals for the shift include pt will remain free from injury      Problem: Pain - Adult  Goal: Verbalizes/displays adequate comfort level or baseline comfort level  7/2/2025 2204 by Zachery Hart RN  Outcome: Progressing  7/2/2025 2200 by Zachery Hart RN  Outcome: Progressing     Problem: Safety - Adult  Goal: Free from fall injury  7/2/2025 2204 by Zachery Hart RN  Outcome: Progressing  7/2/2025 2200 by Zachery Hart RN  Outcome: Progressing     Problem: Discharge Planning  Goal: Discharge to home or other facility with appropriate resources  7/2/2025 2204 by Zachery Hart RN  Outcome: Progressing  7/2/2025 2200 by Zachery Hart RN  Outcome: Progressing     Problem: Chronic Conditions and Co-morbidities  Goal: Patient's chronic conditions and co-morbidity symptoms are monitored and maintained or improved  7/2/2025 2204 by Zachery Hart RN  Outcome: Progressing  7/2/2025 2200 by Zachery Hart RN  Outcome: Progressing     Problem: Nutrition  Goal: Nutrient intake appropriate for maintaining nutritional needs  7/2/2025 2204 by Zachery Hart RN  Outcome: Progressing  7/2/2025 2200 by Zachery Hart RN  Outcome: Progressing     Problem: Fall/Injury  Goal: Not fall by end of shift  7/2/2025 2204 by Zachery Hart RN  Outcome: Progressing  7/2/2025 2200 by Zachery Hart RN  Outcome: Progressing  Goal: Be free from injury by end of the shift  7/2/2025 2204 by Zachery Hart RN  Outcome: Progressing  7/2/2025 2200 by Zachery Hart RN  Outcome: Progressing  Goal: Verbalize understanding of personal risk factors for fall in the hospital  7/2/2025 2204 by Zachery Hart RN  Outcome: Progressing  7/2/2025 2200 by Zachery Hart RN  Outcome: Progressing  Goal: Verbalize understanding of risk factor reduction measures to prevent injury from fall in the home  7/2/2025 2204 by Zachery Hart RN  Outcome:  Progressing  7/2/2025 2200 by Zachery Hart RN  Outcome: Progressing  Goal: Use assistive devices by end of the shift  7/2/2025 2204 by Zachery Hart RN  Outcome: Progressing  7/2/2025 2200 by Zachery Hart RN  Outcome: Progressing  Goal: Pace activities to prevent fatigue by end of the shift  7/2/2025 2204 by Zachery Hart RN  Outcome: Progressing  7/2/2025 2200 by Zachery Hart RN  Outcome: Progressing

## 2025-07-04 LAB
BACTERIA SPEC CULT: ABNORMAL
GRAM STN SPEC: ABNORMAL
GRAM STN SPEC: ABNORMAL

## 2025-07-07 LAB
LABORATORY COMMENT REPORT: NORMAL
PATH REPORT.FINAL DX SPEC: NORMAL
PATH REPORT.GROSS SPEC: NORMAL
PATH REPORT.TOTAL CANCER: NORMAL

## 2025-07-14 NOTE — PROGRESS NOTES
"History Of Present Illness  Edwin Mayorga is a 74 y.o. male status post laparoscopic cholecystectomy with cholangiogram on July 1, 2025 for cholelithiasis with acute gangrenous cholecystitis, gallbladder empyema and pericholecystic abscess.  The patient was continued on Zosyn postop.  He now feels well.  No abdominal pain.  He is tolerating a regular diet without nausea or vomiting.  No fevers or chills.    Last Recorded Vitals  Blood pressure 135/66, pulse 61, temperature 36.3 °C (97.4 °F), temperature source Temporal, resp. rate 16, height 1.702 m (5' 7\"), weight 90.7 kg (200 lb), SpO2 98%.    Physical Exam  General: Well-developed, well-nourished, no acute distress, alert and oriented  Wound: Intact, no erythema or signs of infection  Abdomen: Nondistended, soft, nontender    Relevant Results  Intraoperative cholangiogram:  IMPRESSION:  Unremarkable cholangiogram status post cholecystectomy without  evidence of stenosis or choledocholithiasis.       Surgical Pathology Exam: [id number]  Order: [id number]   Collected 7/1/2025 17:55       Status: Final result       Dx: Calculus of gallbladder with chronic ...    Test Result Released: No (inaccessible in [facility])    0 Result Notes      Component    FINAL DIAGNOSIS   GALLBLADDER, CHOLECYSTECTOMY:  - Acute gangrenous cholecystitis.                ontains abnormal data Tissue/Wound Culture/Smear  Order: [id number]   Collected 7/1/2025 16:49       Status: Final result       Dx: Calculus of gallbladder with chronic ...    Test Result Released: No (inaccessible in [facility])    Specimen Information: GALLBLADDER CHOLECYSTECTOMY; Swab   0 Result Notes  Tissue/Wound Culture/Smear (4+) Abundant Clostridium perfringens Abnormal             Gram Stain  Abnormal   (1+) Rare Polymorphonuclear leukocytes   (3+) Moderate Gram positive bacilli           Resulting Agency: [facility]     Susceptibility       Clostridium perfringens     GRADIENT DIFFUSION    Ampicillin/Sulbactam " Susceptible    Ceftriaxone Susceptible    Clindamycin Resistant    Meropenem Susceptible    Penicillin Susceptible           Assessment/Plan   Diagnoses and all orders for this visit:  Postop check    Recovering well.  No additional antibiotics needed unless the patient becomes symptomatic.  Follow-up as needed.  I told him to follow-up if he develops abdominal pain, fevers or chills.      Brent Johnson MD

## 2025-07-16 ENCOUNTER — APPOINTMENT (OUTPATIENT)
Dept: SURGERY | Facility: CLINIC | Age: 75
End: 2025-07-16
Payer: MEDICARE

## 2025-07-16 VITALS
HEIGHT: 67 IN | DIASTOLIC BLOOD PRESSURE: 66 MMHG | TEMPERATURE: 97.4 F | WEIGHT: 200 LBS | OXYGEN SATURATION: 98 % | BODY MASS INDEX: 31.39 KG/M2 | SYSTOLIC BLOOD PRESSURE: 135 MMHG | HEART RATE: 61 BPM | RESPIRATION RATE: 16 BRPM

## 2025-07-16 DIAGNOSIS — Z09 POSTOP CHECK: Primary | ICD-10-CM

## 2025-07-16 PROCEDURE — 3075F SYST BP GE 130 - 139MM HG: CPT | Performed by: SURGERY

## 2025-07-16 PROCEDURE — 3008F BODY MASS INDEX DOCD: CPT | Performed by: SURGERY

## 2025-07-16 PROCEDURE — 1159F MED LIST DOCD IN RCRD: CPT | Performed by: SURGERY

## 2025-07-16 PROCEDURE — 3078F DIAST BP <80 MM HG: CPT | Performed by: SURGERY

## 2025-07-16 PROCEDURE — 1111F DSCHRG MED/CURRENT MED MERGE: CPT | Performed by: SURGERY

## 2025-07-16 PROCEDURE — 1126F AMNT PAIN NOTED NONE PRSNT: CPT | Performed by: SURGERY

## 2025-07-16 PROCEDURE — 99024 POSTOP FOLLOW-UP VISIT: CPT | Performed by: SURGERY

## 2025-07-16 ASSESSMENT — PAIN SCALES - GENERAL: PAINLEVEL_OUTOF10: 0-NO PAIN

## 2025-07-16 NOTE — LETTER
"July 16, 2025     Raúl Weston DO  [address] [facility] [address]    Patient: Edwin Mayorga   YOB: 1950   Date of Visit: 7/16/2025       Dear Dr. Raúl Weston DO:    Thank you for referring Edwin Mayorga to me for evaluation. Below are my notes for this consultation.  If you have questions, please do not hesitate to call me. I look forward to following your patient along with you.       Sincerely,     Brent Johnson MD      CC: No Recipients  ______________________________________________________________________________________    History Of Present Illness  Edwin Mayorga is a 74 y.o. male status post laparoscopic cholecystectomy with cholangiogram on July 1, 2025 for cholelithiasis with acute gangrenous cholecystitis, gallbladder empyema and pericholecystic abscess.  The patient was continued on Zosyn postop.  He now feels well.  No abdominal pain.  He is tolerating a regular diet without nausea or vomiting.  No fevers or chills.    Last Recorded Vitals  Blood pressure 135/66, pulse 61, temperature 36.3 °C (97.4 °F), temperature source Temporal, resp. rate 16, height 1.702 m (5' 7\"), weight 90.7 kg (200 lb), SpO2 98%.    Physical Exam  General: Well-developed, well-nourished, no acute distress, alert and oriented  Wound: Intact, no erythema or signs of infection  Abdomen: Nondistended, soft, nontender    Relevant Results  Intraoperative cholangiogram:  IMPRESSION:  Unremarkable cholangiogram status post cholecystectomy without  evidence of stenosis or choledocholithiasis.       Surgical Pathology Exam: [id number]  Order: [id number]   Collected 7/1/2025 17:55       Status: Final result       Dx: Calculus of gallbladder with chronic ...    Test Result Released: No (inaccessible in [facility])    0 Result Notes      Component    FINAL DIAGNOSIS   GALLBLADDER, CHOLECYSTECTOMY:  - Acute gangrenous cholecystitis.                ontains abnormal data Tissue/Wound " Culture/Smear  Order: [id number]   Collected 7/1/2025 16:49       Status: Final result       Dx: Calculus of gallbladder with chronic ...    Test Result Released: No (inaccessible in [facility])    Specimen Information: GALLBLADDER CHOLECYSTECTOMY; Swab   0 Result Notes  Tissue/Wound Culture/Smear (4+) Abundant Clostridium perfringens Abnormal             Gram Stain  Abnormal   (1+) Rare Polymorphonuclear leukocytes   (3+) Moderate Gram positive bacilli           Resulting Agency: [facility]     Susceptibility       Clostridium perfringens     GRADIENT DIFFUSION    Ampicillin/Sulbactam Susceptible    Ceftriaxone Susceptible    Clindamycin Resistant    Meropenem Susceptible    Penicillin Susceptible           Assessment/Plan  Diagnoses and all orders for this visit:  Postop check    Recovering well.  No additional antibiotics needed unless the patient becomes symptomatic.  Follow-up as needed.  I told him to follow-up if he develops abdominal pain, fevers or chills.      Brent Johnson MD

## 2025-07-19 LAB
ATRIAL RATE: 60 BPM
P AXIS: 105 DEGREES
P OFFSET: 176 MS
P ONSET: 147 MS
PR INTERVAL: 148 MS
Q ONSET: 221 MS
QRS COUNT: 10 BEATS
QRS DURATION: 72 MS
QT INTERVAL: 422 MS
QTC CALCULATION(BAZETT): 422 MS
QTC FREDERICIA: 422 MS
R AXIS: 33 DEGREES
T AXIS: 47 DEGREES
T OFFSET: 432 MS
VENTRICULAR RATE: 60 BPM

## (undated) DEVICE — SOLUTION, INJECTION, CONTRAST, IOTHALAMATE MEGLUMINE 60%, CONRAY, 50 CC, VIAL

## (undated) DEVICE — Device

## (undated) DEVICE — CATHETER, IV, ANGIOCATH, 14 G X 5.25 IN, FEP POLYMER

## (undated) DEVICE — CATHETER, CHOLANGIOGRAM, 4.5 FR, 45 CM, 18 IN

## (undated) DEVICE — RETRIEVAL SYSTEM, MONARCH, 10MM DISP ENDOSCOPIC

## (undated) DEVICE — CLEAN KIT, ANTIFOG SCOPE, SEE SHARP 150MM

## (undated) DEVICE — TROCAR SYSTEM, BALLOON, KII GELPORT, 12 X 100MM

## (undated) DEVICE — CATHETER, IV, ANGIOCATH, 12 G X 3 IN, FEP POLYMER

## (undated) DEVICE — CATHETER, IV, ANGIOCATH, 14 G X 1.88 IN, FEP POLYMER

## (undated) DEVICE — SLEEVE, VASO PRESS, CALF GARMENT, MEDIUM, GREEN

## (undated) DEVICE — GRASPER TIP, LAPCLINCH, DISP

## (undated) DEVICE — DRESSING, DRAIN SPONGE, EXCILON AMD, 4X4 IN, 6 PLY, ST

## (undated) DEVICE — CAUTERY, PENCIL, PUSH BUTTON, SMOKE EVAC, 70MM

## (undated) DEVICE — ASSEMBLY, STRYKER FLOW 2, SUCTION IRRIGATOR, WITH TIP

## (undated) DEVICE — EVACUATOR, WOUND, SUCTION, CLOSED, JACKSON-PRATT, 100 CC, SILICONE

## (undated) DEVICE — TROCAR, KII OPTICAL BLADELESS 5MM Z THREAD 100MM LNGTH

## (undated) DEVICE — HOLSTER, ELECTROSURGERY ACCESSORY, STERILE

## (undated) DEVICE — CLIP, ENDO APPLIER LIGAMAX 5MM

## (undated) DEVICE — DRESSING, TELFA, 3X4

## (undated) DEVICE — DRAIN, CHANNEL, ROUND, FULL FLUTE 19F

## (undated) DEVICE — SOLUTION, ANTI FOG, W/SPONGE, ENDOMATE

## (undated) DEVICE — DRESSING, TRANSPARENT, TEGADERM, 2-3/8 X 2-3/4 IN

## (undated) DEVICE — CORD, MONOPOLAR, 10 FT, DISPOSABLE

## (undated) DEVICE — SCISSOR TIP, ENDOCUT, LAPAROSCOPIC

## (undated) DEVICE — SLEEVE, KII, Z-THREAD, 5X100CM